# Patient Record
Sex: FEMALE | Race: WHITE | NOT HISPANIC OR LATINO | URBAN - METROPOLITAN AREA
[De-identification: names, ages, dates, MRNs, and addresses within clinical notes are randomized per-mention and may not be internally consistent; named-entity substitution may affect disease eponyms.]

---

## 2021-06-02 ENCOUNTER — TELEPHONE (OUTPATIENT)
Dept: PEDIATRICS CLINIC | Facility: CLINIC | Age: 31
End: 2021-06-02

## 2021-06-02 NOTE — TELEPHONE ENCOUNTER
Most of family is not vaccinated and mom has questions of how safe it would be for them coming to visit baby  Please call Gian Oates at [phone]  Thank you

## 2022-12-05 ENCOUNTER — HOSPITAL ENCOUNTER (OUTPATIENT)
Dept: CT IMAGING | Facility: HOSPITAL | Age: 32
Discharge: HOME | End: 2022-12-05

## 2022-12-05 DIAGNOSIS — J32.9 SINUSITIS, UNSPECIFIED CHRONICITY, UNSPECIFIED LOCATION: ICD-10-CM

## 2023-02-08 PROBLEM — D64.9 ANEMIA: Status: ACTIVE | Noted: 2023-02-08

## 2023-02-08 PROBLEM — F41.1 GENERALIZED ANXIETY DISORDER: Status: ACTIVE | Noted: 2023-02-08

## 2023-02-08 PROBLEM — K44.9 HIATAL HERNIA: Status: ACTIVE | Noted: 2022-06-15

## 2023-02-08 PROBLEM — Z88.9 HISTORY OF SEASONAL ALLERGIES: Status: ACTIVE | Noted: 2023-02-08

## 2023-02-08 PROBLEM — J30.89 ALLERGIC RHINITIS DUE TO HOUSE DUST MITE: Status: ACTIVE | Noted: 2023-02-08

## 2023-02-08 PROBLEM — J30.1 CHRONIC SEASONAL ALLERGIC RHINITIS DUE TO POLLEN: Status: ACTIVE | Noted: 2023-02-08

## 2023-02-08 PROBLEM — R94.31 RIGHT AXIS DEVIATION: Status: ACTIVE | Noted: 2022-03-10

## 2025-01-09 ENCOUNTER — TELEPHONE (OUTPATIENT)
Age: 35
End: 2025-01-09

## 2025-01-09 NOTE — TELEPHONE ENCOUNTER
LVM - Called patient to ask for any updated insurance info and inform patient that if her insurance is still [facility] that [facility] does not take that insurance and that she would then become self pay and she would have to pay a $75.50 charge at the time of service.

## 2025-01-14 ENCOUNTER — OFFICE VISIT (OUTPATIENT)
Age: 35
End: 2025-01-14

## 2025-01-14 VITALS
HEART RATE: 70 BPM | BODY MASS INDEX: 25.52 KG/M2 | HEIGHT: 60 IN | SYSTOLIC BLOOD PRESSURE: 114 MMHG | DIASTOLIC BLOOD PRESSURE: 69 MMHG | WEIGHT: 130 LBS

## 2025-01-14 DIAGNOSIS — M54.59 MECHANICAL LOW BACK PAIN: ICD-10-CM

## 2025-01-14 DIAGNOSIS — M99.01 SEGMENTAL DYSFUNCTION OF CERVICAL REGION: ICD-10-CM

## 2025-01-14 DIAGNOSIS — M99.04 SEGMENTAL DYSFUNCTION OF SACRAL REGION: ICD-10-CM

## 2025-01-14 DIAGNOSIS — M99.02 SEGMENTAL DYSFUNCTION OF THORACIC REGION: ICD-10-CM

## 2025-01-14 DIAGNOSIS — M54.2 NECK PAIN: ICD-10-CM

## 2025-01-14 DIAGNOSIS — M99.03 SEGMENTAL DYSFUNCTION OF LUMBAR REGION: Primary | ICD-10-CM

## 2025-01-14 PROCEDURE — 98941 CHIROPRACT MANJ 3-4 REGIONS: CPT | Performed by: CHIROPRACTOR

## 2025-01-14 PROCEDURE — 99203 OFFICE O/P NEW LOW 30 MIN: CPT | Performed by: CHIROPRACTOR

## 2025-01-14 PROCEDURE — 97110 THERAPEUTIC EXERCISES: CPT | Performed by: CHIROPRACTOR

## 2025-01-14 NOTE — PROGRESS NOTES
Diagnoses and all orders for this visit:    Segmental dysfunction of lumbar region    Neck pain    Mechanical low back pain    Segmental dysfunction of sacral region    Segmental dysfunction of thoracic region    Segmental dysfunction of cervical region      ASSESSMENT:  No red flags, radiculopathy or neurologic deficit appreciated clinically. Pt's symptoms and exam findings consistent with mechanical neck/ubp secondary to repetitive st/sp injury, exacerbated by postural/ergonomic stressors. Pt responded well to stretches and manual mobilization of the affected spinal and myofascial tissues with increased ROM; trial of conservative tx recommended consisting of stretching, ther-ex, graded mobilization/manipulation of the affected spinal/myofascial tissues, postural/ergonomic education and take home stretches/exercises. If symptoms fail to improve with short trial of conservative care, appropriate imaging and referral will be coordinated.  Spent greater than 30 min c pt discussing hx, pe, ddx, tx options and reviewing notes/imaging    PROCEDURE CODES: 20651    TREATMENT:  Fear avoidance behavior discussion, encouraged and reassured pt that natural course of condition is to improve over time with adherence to tx plan and home care strategies. Home care recommendations: avoid bed rest, walk (but avoid trails and uneven surfaces), gradual return to activity to tolerance (avoid anything that peripheralizes symptoms), ice 20 min on/off, watch for ice burn, call if symptoms peripheralize, worsen, or neurologic deficit progresses. Ther-ex: IASTM - discussed post procedure soreness and/or ecchymosis for up to 36 hrs, applied to affected mm hypertonicities; wall arely, axial retraction, upper trap stretch, lev scap stretch, SCM stretch, lat rows with t-band, lat pull down with t-band, abdominal bracing; greater than 15 min spent performing above mentioned ther-ex. Thoracic mobilization/manipulation: prone P-A mob; cervical  "mobilization/manipulation: diversified supine graded mobilization, cervical traction, prone C/T jct HVLA    HPI:  Tabatha Moe is a 34 y.o. female   Chief Complaint   Patient presents with   • Neck - Follow-up     Neck pain that radiates down left side with numbness and sore feeling that is constant. . Patient states a \"knot \"   Pain score 7-9   • Back Pain     Lower lumbar pain that radiates down left side. Patient states pinching and sore. Pain score 6-8        The patient presents to the office with L sided neck pain and lower back pain across the back. Both pain complaints are chronic in nature- neck pain ranges from 7-9/10 and lower back is 6-8/10. The patient has gone to Chiropractic since she was 11 but then mentions her chiropractor retired. Exacerbations-Looking down and working at [email or web address]. 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 The pt across the lower back, its pinching with some shooting. He has a 3 yr old and although is usually physical active at this time is not doing much exercise. Headaches- suboccipital, only frequently, No significant exercise routine.    Back Pain  Associated symptoms include headaches. Pertinent negatives include no chest pain, fever, numbness or weakness.     Past Medical History:   Diagnosis Date   • Anemia    • Anxiety    • Deviated septum    • Frequent urination    • Gastritis    • Headache    • Hiatal hernia    • History of chickenpox    • Post partum depression 06/30/2021   • Reflux esophagitis    • Sinusitis    • Wears contact lenses       The following portions of the patient's history were reviewed and updated as appropriate: allergies, past family history, past medical history, past social history, past surgical history, and problem list.  Review of Systems   Constitutional:  Negative for activity change, fatigue, fever and unexpected weight change.   HENT:  Negative for ear pain, hearing loss, sinus pressure, sinus pain, sore " throat and tinnitus.    Respiratory:  Negative for chest tightness, shortness of breath, wheezing and stridor.    Cardiovascular:  Negative for chest pain.   Genitourinary:  Negative for flank pain and frequency.   Musculoskeletal:  Positive for back pain, myalgias, neck pain and neck stiffness. Negative for joint swelling.   Skin:  Negative for color change and pallor.   Neurological:  Positive for headaches. Negative for dizziness, speech difficulty, weakness and numbness.   Psychiatric/Behavioral:  Negative for agitation and sleep disturbance. The patient is not nervous/anxious.      Physical Exam  Constitutional:       General: She is not in acute distress.     Appearance: Normal appearance.   HENT:      Head: Normocephalic.      Mouth/Throat:      Mouth: Mucous membranes are moist.   Eyes:      Extraocular Movements: Extraocular movements intact.      Conjunctiva/sclera: Conjunctivae normal.      Pupils: Pupils are equal, round, and reactive to light.   Neck:      Vascular: No carotid bruit.   Pulmonary:      Effort: Pulmonary effort is normal.   Chest:      Chest wall: No tenderness.   Abdominal:      General: Abdomen is flat.      Palpations: Abdomen is soft.   Musculoskeletal:         General: Tenderness present. No swelling, deformity or signs of injury. Normal range of motion.        Arms:       Cervical back: Normal range of motion. Rigidity and tenderness present.      Right lower leg: No edema.      Left lower leg: No edema.   Lymphadenopathy:      Cervical: No cervical adenopathy.   Skin:     General: Skin is warm.      Coloration: Skin is not jaundiced or pale.      Findings: No bruising or erythema.   Neurological:      Mental Status: She is alert and oriented to person, place, and time.      Cranial Nerves: No cranial nerve deficit.      Sensory: No sensory deficit.      Motor: No weakness.      Gait: Gait is intact.      Deep Tendon Reflexes: Reflexes are normal and symmetric.   Psychiatric:          Attention and Perception: Attention normal.         Mood and Affect: Mood and affect normal.         Speech: Speech normal.         Behavior: Behavior normal. Behavior is cooperative.         Thought Content: Thought content normal.         Cognition and Memory: Cognition normal.         Judgment: Judgment normal.       SOFT TISSUE ASSESSMENT: Hypertonicity and tenderness palpated C5-T6 erector spinae, upper traps, lev scap, SCM, scalene, rhomboid, suboccipitals, L3-S1 QL, parapsinals, psoas, hip flexor JOINT RECTRICTIONS: C5-T6, L3-S1 ORTHO: Latia unremarkable for centralization/peripheralization; max foraminal comp elicits local np R/L; shoulder depression elicits stiffness in L upper trap; brachial plexus tension test elicits no neural tension in R/L UE; cervical distraction elicits relieves CC, SLUMP- neg, Sitting ROOT- Neg, SLR- neg    Return in about 1 week (around 1/21/2025) for Recheck.

## 2025-01-28 ENCOUNTER — PROCEDURE VISIT (OUTPATIENT)
Age: 35
End: 2025-01-28

## 2025-01-28 VITALS
DIASTOLIC BLOOD PRESSURE: 90 MMHG | HEART RATE: 88 BPM | WEIGHT: 130 LBS | SYSTOLIC BLOOD PRESSURE: 140 MMHG | HEIGHT: 60 IN | BODY MASS INDEX: 25.52 KG/M2

## 2025-01-28 DIAGNOSIS — M99.02 SEGMENTAL DYSFUNCTION OF THORACIC REGION: ICD-10-CM

## 2025-01-28 DIAGNOSIS — M99.03 SEGMENTAL DYSFUNCTION OF LUMBAR REGION: ICD-10-CM

## 2025-01-28 DIAGNOSIS — M99.04 SEGMENTAL DYSFUNCTION OF SACRAL REGION: ICD-10-CM

## 2025-01-28 DIAGNOSIS — M54.2 NECK PAIN: Primary | ICD-10-CM

## 2025-01-28 DIAGNOSIS — M99.01 SEGMENTAL DYSFUNCTION OF CERVICAL REGION: ICD-10-CM

## 2025-01-28 DIAGNOSIS — M54.59 MECHANICAL LOW BACK PAIN: ICD-10-CM

## 2025-01-28 PROCEDURE — 98941 CHIROPRACT MANJ 3-4 REGIONS: CPT | Performed by: CHIROPRACTOR

## 2025-01-28 NOTE — PROGRESS NOTES
Diagnoses and all orders for this visit:    Neck pain    Segmental dysfunction of lumbar region    Mechanical low back pain    Segmental dysfunction of sacral region    Segmental dysfunction of thoracic region    Segmental dysfunction of cervical region      ASSESSMENT:  Pt's symptoms and exam findings consistent with mechanical neck/ubp secondary to repetitive st/sp injury, exacerbated by postural/ergonomic stressors. Pt responded well to stretches and manual mobilization of the affected spinal and myofascial tissues with increased ROM; trial of conservative tx recommended consisting of stretching, ther-ex, graded mobilization/manipulation of the affected spinal/myofascial tissues, postural/ergonomic education and take home stretches/exercises. If symptoms fail to improve with short trial of conservative care, appropriate imaging and referral will be coordinated.    PROCEDURE CODES: 78817    TREATMENT:  Fear avoidance behavior discussion, encouraged and reassured pt that natural course of condition is to improve over time with adherence to tx plan and home care strategies. Home care recommendations: avoid bed rest, walk (but avoid trails and uneven surfaces), gradual return to activity to tolerance (avoid anything that peripheralizes symptoms), ice 20 min on/off, watch for ice burn, call if symptoms peripheralize, worsen, or neurologic deficit progresses. Ther-ex: IASTM - discussed post procedure soreness and/or ecchymosis for up to 36 hrs, applied to affected mm hypertonicities; wall arely, axial retraction, upper trap stretch, lev scap stretch, SCM stretch, lat rows with t-band, lat pull down with t-band, abdominal bracing; greater than 15 min spent performing above mentioned ther-ex. Thoracic mobilization/manipulation: prone P-A mob; cervical mobilization/manipulation: diversified supine graded mobilization, cervical traction, prone C/T jct HVLA    HPI:  Tabatha Moe is a 34 y.o. female   Chief Complaint    Patient presents with   • Neck - Follow-up     Neck has slightly sore on left and right that moves around. Pain score 6      • Back Pain     Lower lumbar is very sore on left side   . Pain score 7        The patient presents to the office with L sided neck pain and lower back pain across the back. Both pain complaints are chronic in nature- neck pain ranges from 7-9/10 and lower back is 6-8/10. The patient has gone to Chiropractic since she was 11 but then mentions her chiropractor retired. Exacerbations-Looking down and working at computer. 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 The pt across the lower back, its pinching with some shooting. He has a 3 yr old and although is usually physical active at this time is not doing much exercise. Headaches- suboccipital, only frequently, No significant exercise routine.  1/28/25- The patient     Back Pain  Associated symptoms include headaches. Pertinent negatives include no chest pain, fever, numbness or weakness.     Past Medical History:   Diagnosis Date   • Anemia    • Anxiety    • Deviated septum    • Frequent urination    • Gastritis    • Headache    • Hiatal hernia    • History of chickenpox    • Post partum depression 06/30/2021   • Reflux esophagitis    • Sinusitis    • Wears contact lenses       The following portions of the patient's history were reviewed and updated as appropriate: allergies, past family history, past medical history, past social history, past surgical history, and problem list.  Review of Systems   Constitutional:  Negative for activity change, fatigue, fever and unexpected weight change.   HENT:  Negative for ear pain, hearing loss, sinus pressure, sinus pain, sore throat and tinnitus.    Respiratory:  Negative for chest tightness, shortness of breath, wheezing and stridor.    Cardiovascular:  Negative for chest pain.   Genitourinary:  Negative for flank pain and frequency.   Musculoskeletal:  Positive for  back pain, myalgias, neck pain and neck stiffness. Negative for joint swelling.   Skin:  Negative for color change and pallor.   Neurological:  Positive for headaches. Negative for dizziness, speech difficulty, weakness and numbness.   Psychiatric/Behavioral:  Negative for agitation and sleep disturbance. The patient is not nervous/anxious.      Physical Exam  Constitutional:       General: She is not in acute distress.     Appearance: Normal appearance.   HENT:      Head: Normocephalic.      Mouth/Throat:      Mouth: Mucous membranes are moist.   Eyes:      Extraocular Movements: Extraocular movements intact.      Conjunctiva/sclera: Conjunctivae normal.      Pupils: Pupils are equal, round, and reactive to light.   Neck:      Vascular: No carotid bruit.   Pulmonary:      Effort: Pulmonary effort is normal.   Chest:      Chest wall: No tenderness.   Abdominal:      General: Abdomen is flat.      Palpations: Abdomen is soft.   Musculoskeletal:         General: Tenderness present. No swelling, deformity or signs of injury. Normal range of motion.        Arms:       Cervical back: Normal range of motion. Rigidity and tenderness present.      Right lower leg: No edema.      Left lower leg: No edema.   Lymphadenopathy:      Cervical: No cervical adenopathy.   Skin:     General: Skin is warm.      Coloration: Skin is not jaundiced or pale.      Findings: No bruising or erythema.   Neurological:      Mental Status: She is alert and oriented to person, place, and time.      Cranial Nerves: No cranial nerve deficit.      Sensory: No sensory deficit.      Motor: No weakness.      Gait: Gait is intact.      Deep Tendon Reflexes: Reflexes are normal and symmetric.   Psychiatric:         Attention and Perception: Attention normal.         Mood and Affect: Mood and affect normal.         Speech: Speech normal.         Behavior: Behavior normal. Behavior is cooperative.         Thought Content: Thought content normal.          Cognition and Memory: Cognition normal.         Judgment: Judgment normal.     SOFT TISSUE ASSESSMENT: Hypertonicity and tenderness palpated C5-T6 erector spinae, upper traps, lev scap, SCM, scalene, rhomboid, suboccipitals, L3-S1 QL, parapsinals, psoas, hip flexor JOINT RECTRICTIONS: C5-T6, L3-S1 ORTHO: Latia unremarkable for centralization/peripheralization; max foraminal comp elicits local np R/L; shoulder depression elicits stiffness in L upper trap; brachial plexus tension test elicits no neural tension in R/L UE; cervical distraction elicits relieves CC, SLUMP- neg, Sitting ROOT- Neg, SLR- neg    Return in about 1 week (around 2/4/2025) for Recheck.

## 2025-02-04 ENCOUNTER — PROCEDURE VISIT (OUTPATIENT)
Age: 35
End: 2025-02-04

## 2025-02-04 VITALS
HEIGHT: 60 IN | BODY MASS INDEX: 25.52 KG/M2 | DIASTOLIC BLOOD PRESSURE: 98 MMHG | HEART RATE: 91 BPM | SYSTOLIC BLOOD PRESSURE: 149 MMHG | WEIGHT: 130 LBS

## 2025-02-04 DIAGNOSIS — M99.02 SEGMENTAL DYSFUNCTION OF THORACIC REGION: ICD-10-CM

## 2025-02-04 DIAGNOSIS — M54.59 MECHANICAL LOW BACK PAIN: ICD-10-CM

## 2025-02-04 DIAGNOSIS — M99.04 SEGMENTAL DYSFUNCTION OF SACRAL REGION: ICD-10-CM

## 2025-02-04 DIAGNOSIS — M99.01 SEGMENTAL DYSFUNCTION OF CERVICAL REGION: ICD-10-CM

## 2025-02-04 DIAGNOSIS — M99.03 SEGMENTAL DYSFUNCTION OF LUMBAR REGION: ICD-10-CM

## 2025-02-04 DIAGNOSIS — M54.2 NECK PAIN: Primary | ICD-10-CM

## 2025-02-04 PROCEDURE — 98941 CHIROPRACT MANJ 3-4 REGIONS: CPT | Performed by: CHIROPRACTOR

## 2025-02-04 RX ORDER — CALCIUM CARBONATE 500 MG/1
TABLET, CHEWABLE ORAL
COMMUNITY

## 2025-02-04 NOTE — PROGRESS NOTES
Diagnoses and all orders for this visit:    Neck pain    Segmental dysfunction of lumbar region    Mechanical low back pain    Segmental dysfunction of sacral region    Segmental dysfunction of thoracic region    Segmental dysfunction of cervical region    Other orders  -     calcium carbonate (Tums) 500 mg chewable tablet      ASSESSMENT:  Pt's symptoms and exam findings consistent with mechanical neck/ubp secondary to repetitive st/sp injury, exacerbated by postural/ergonomic stressors. Pt responded well to stretches and manual mobilization of the affected spinal and myofascial tissues with increased ROM; trial of conservative tx recommended consisting of stretching, ther-ex, graded mobilization/manipulation of the affected spinal/myofascial tissues, postural/ergonomic education and take home stretches/exercises. If symptoms fail to improve with short trial of conservative care, appropriate imaging and referral will be coordinated.  -Pt has little change last visit, tolerated treatment well today with some decrease in pain and mm spasm.     PROCEDURE CODES: 20120    TREATMENT:  Fear avoidance behavior discussion, encouraged and reassured pt that natural course of condition is to improve over time with adherence to tx plan and home care strategies. Home care recommendations: avoid bed rest, walk (but avoid trails and uneven surfaces), gradual return to activity to tolerance (avoid anything that peripheralizes symptoms), ice 20 min on/off, watch for ice burn, call if symptoms peripheralize, worsen, or neurologic deficit progresses. Ther-ex: IASTM - discussed post procedure soreness and/or ecchymosis for up to 36 hrs, applied to affected mm hypertonicities; wall arely, axial retraction, upper trap stretch, lev scap stretch, SCM stretch, lat rows with t-band, lat pull down with t-band, abdominal bracing; greater than 15 min spent performing above mentioned ther-ex. Thoracic mobilization/manipulation: prone P-A mob;  cervical mobilization/manipulation: diversified supine graded mobilization, cervical traction, prone C/T jct HVLA    HPI:  Tabatha Moe is a 34 y.o. female   Chief Complaint   Patient presents with   • Neck - Neck Pain     Pain score - 6   • Lower Back - Back Pain     Pain score 7 - Her back hurts more on the left side ( buttock)     The patient presents to the office with L sided neck pain and lower back pain across the back. Both pain complaints are chronic in nature- neck pain ranges from 7-9/10 and lower back is 6-8/10. The patient has gone to Chiropractic since she was 11 but then mentions her chiropractor retired. Exacerbations-Looking down and working at computer. 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 The pt across the lower back, its pinching with some shooting. He has a 3 yr old and although is usually physical active at this time is not doing much exercise. Headaches- suboccipital, only frequently, No significant exercise routine.  1/28/25- The patient   2/4/25- 6-7 on the R and  and 7-8/10 in the lower back, no change in pain after her last visit.     Back Pain  Associated symptoms include headaches.   Neck Pain   Associated symptoms include headaches.     Past Medical History:   Diagnosis Date   • Anemia    • Anxiety    • Deviated septum    • Frequent urination    • Gastritis    • Headache    • Hiatal hernia    • History of chickenpox    • Post partum depression 06/30/2021   • Reflux esophagitis    • Sinusitis    • Wears contact lenses       The following portions of the patient's history were reviewed and updated as appropriate: allergies, past family history, past medical history, past social history, past surgical history, and problem list.  Review of Systems   Musculoskeletal:  Positive for back pain, myalgias, neck pain and neck stiffness.   Neurological:  Positive for headaches.     Physical Exam  Constitutional:       Appearance: Normal appearance.   HENT:       Head: Normocephalic.   Eyes:      Extraocular Movements: Extraocular movements intact.      Conjunctiva/sclera: Conjunctivae normal.      Pupils: Pupils are equal, round, and reactive to light.   Abdominal:      General: Abdomen is flat.      Palpations: Abdomen is soft.   Musculoskeletal:         General: Tenderness present. Normal range of motion.        Arms:       Cervical back: Normal range of motion. Rigidity and tenderness present.   Neurological:      Mental Status: She is alert and oriented to person, place, and time.      Gait: Gait is intact.      Deep Tendon Reflexes: Reflexes are normal and symmetric.   Psychiatric:         Attention and Perception: Attention normal.         Mood and Affect: Mood and affect normal.         Speech: Speech normal.         Behavior: Behavior normal. Behavior is cooperative.         Thought Content: Thought content normal.         Cognition and Memory: Cognition normal.         Judgment: Judgment normal.       SOFT TISSUE ASSESSMENT: Hypertonicity and tenderness palpated C5-T6 erector spinae, upper traps, lev scap, SCM, scalene, rhomboid, suboccipitals, L3-S1 QL, parapsinals, psoas, hip flexor JOINT RECTRICTIONS: C5-T6, L3-S1 ORTHO: Latia unremarkable for centralization/peripheralization; max foraminal comp elicits local np R/L; shoulder depression elicits stiffness in L upper trap; brachial plexus tension test elicits no neural tension in R/L UE; cervical distraction elicits relieves CC, SLUMP- neg, Sitting ROOT- Neg, SLR- neg    Return in about 1 week (around 2/11/2025) for Recheck.

## 2025-02-11 ENCOUNTER — PROCEDURE VISIT (OUTPATIENT)
Age: 35
End: 2025-02-11

## 2025-02-11 DIAGNOSIS — M99.02 SEGMENTAL DYSFUNCTION OF THORACIC REGION: ICD-10-CM

## 2025-02-11 DIAGNOSIS — M99.04 SEGMENTAL DYSFUNCTION OF SACRAL REGION: ICD-10-CM

## 2025-02-11 DIAGNOSIS — M54.59 MECHANICAL LOW BACK PAIN: ICD-10-CM

## 2025-02-11 DIAGNOSIS — M99.03 SEGMENTAL DYSFUNCTION OF LUMBAR REGION: ICD-10-CM

## 2025-02-11 DIAGNOSIS — M99.01 SEGMENTAL DYSFUNCTION OF CERVICAL REGION: ICD-10-CM

## 2025-02-11 DIAGNOSIS — M54.2 NECK PAIN: Primary | ICD-10-CM

## 2025-02-11 PROCEDURE — 98941 CHIROPRACT MANJ 3-4 REGIONS: CPT | Performed by: CHIROPRACTOR

## 2025-02-11 RX ORDER — DEXTROMETHORPHAN HYDROBROMIDE AND PROMETHAZINE HYDROCHLORIDE 15; 6.25 MG/5ML; MG/5ML
5 SYRUP ORAL 4 TIMES DAILY PRN
COMMUNITY
Start: 2025-02-07

## 2025-02-11 RX ORDER — PREDNISONE 20 MG/1
20 TABLET ORAL DAILY
COMMUNITY
Start: 2025-02-07 | End: 2025-02-12

## 2025-02-11 RX ORDER — ALBUTEROL SULFATE 90 UG/1
2 INHALANT RESPIRATORY (INHALATION) EVERY 6 HOURS PRN
COMMUNITY
Start: 2025-02-07

## 2025-02-11 NOTE — PROGRESS NOTES
Diagnoses and all orders for this visit:    Neck pain    Segmental dysfunction of lumbar region    Mechanical low back pain    Segmental dysfunction of sacral region    Segmental dysfunction of thoracic region    Segmental dysfunction of cervical region    Other orders  -     albuterol (PROVENTIL HFA,VENTOLIN HFA) 90 mcg/act inhaler; Inhale 2 puffs every 6 (six) hours as needed  -     predniSONE 20 mg tablet; Take 20 mg by mouth daily  -     promethazine-dextromethorphan (PHENERGAN-DM) 6.25-15 mg/5 mL oral syrup; Take 5 mL by mouth 4 (four) times a day as needed      ASSESSMENT:  Pt's symptoms and exam findings consistent with mechanical neck/ubp secondary to repetitive st/sp injury, exacerbated by postural/ergonomic stressors. Pt responded well to stretches and manual mobilization of the affected spinal and myofascial tissues with increased ROM; trial of conservative tx recommended consisting of stretching, ther-ex, graded mobilization/manipulation of the affected spinal/myofascial tissues, postural/ergonomic education and take home stretches/exercises. If symptoms fail to improve with short trial of conservative care, appropriate imaging and referral will be coordinated.  -Pt has little change last visit, tolerated treatment well today with some decrease in pain and mm spasm.     PROCEDURE CODES: 14315    TREATMENT:  Fear avoidance behavior discussion, encouraged and reassured pt that natural course of condition is to improve over time with adherence to tx plan and home care strategies. Home care recommendations: avoid bed rest, walk (but avoid trails and uneven surfaces), gradual return to activity to tolerance (avoid anything that peripheralizes symptoms), ice 20 min on/off, watch for ice burn, call if symptoms peripheralize, worsen, or neurologic deficit progresses. Ther-ex: IASTM - discussed post procedure soreness and/or ecchymosis for up to 36 hrs, applied to affected mm hypertonicities; wall arely, axial  retraction, upper trap stretch, lev scap stretch, SCM stretch, lat rows with t-band, lat pull down with t-band, abdominal bracing; greater than 15 min spent performing above mentioned ther-ex. Thoracic mobilization/manipulation: prone P-A mob; cervical mobilization/manipulation: diversified supine graded mobilization, cervical traction, prone C/T jct HVLA    HPI:  Tabatha Moe is a 34 y.o. female   Chief Complaint   Patient presents with   • Neck - Follow-up     Neck pain score 7     Patient is getting over being sick    • Rib Pain     Left rib cage around is sore from coughing      • Back Pain     Lower lumbar is  sore . Pain score 7        The patient presents to the office with L sided neck pain and lower back pain across the back. Both pain complaints are chronic in nature- neck pain ranges from 7-9/10 and lower back is 6-8/10. The patient has gone to Chiropractic since she was 11 but then mentions her chiropractor retired. Exacerbations-Looking down and working at computer. 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 The pt across the lower back, its pinching with some shooting. He has a 3 yr old and although is usually physical active at this time is not doing much exercise. Headaches- suboccipital, only frequently, No significant exercise routine.  1/28/25- The patient   2/4/25- 6-7 on the R and  and 7-8/10 in the lower back, no change in pain after her last visit.   2/11- The patient has increased pain after being sick with bronchitis,  L rib cage pain. 7/10 pain in the neck and back    Back Pain  Associated symptoms include headaches.   Neck Pain   Associated symptoms include headaches.     Past Medical History:   Diagnosis Date   • Anemia    • Anxiety    • Deviated septum    • Frequent urination    • Gastritis    • Headache    • Hiatal hernia    • History of chickenpox    • Post partum depression 06/30/2021   • Reflux esophagitis    • Sinusitis    • Wears contact lenses        The following portions of the patient's history were reviewed and updated as appropriate: allergies, past family history, past medical history, past social history, past surgical history, and problem list.  Review of Systems   Musculoskeletal:  Positive for back pain, myalgias, neck pain and neck stiffness.   Neurological:  Positive for headaches.     Physical Exam  Constitutional:       Appearance: Normal appearance.   HENT:      Head: Normocephalic.   Eyes:      Extraocular Movements: Extraocular movements intact.      Conjunctiva/sclera: Conjunctivae normal.      Pupils: Pupils are equal, round, and reactive to light.   Abdominal:      General: Abdomen is flat.      Palpations: Abdomen is soft.   Musculoskeletal:         General: Tenderness present. Normal range of motion.        Arms:       Cervical back: Normal range of motion. Rigidity and tenderness present.   Neurological:      Mental Status: She is alert and oriented to person, place, and time.      Gait: Gait is intact.      Deep Tendon Reflexes: Reflexes are normal and symmetric.   Psychiatric:         Attention and Perception: Attention normal.         Mood and Affect: Mood and affect normal.         Speech: Speech normal.         Behavior: Behavior normal. Behavior is cooperative.         Thought Content: Thought content normal.         Cognition and Memory: Cognition normal.         Judgment: Judgment normal.       SOFT TISSUE ASSESSMENT: Hypertonicity and tenderness palpated C5-T6 erector spinae, upper traps, lev scap, SCM, scalene, rhomboid, suboccipitals, L3-S1 QL, parapsinals, psoas, hip flexor JOINT RECTRICTIONS: C5-T6, L3-S1 ORTHO: Latia unremarkable for centralization/peripheralization; max foraminal comp elicits local np R/L; shoulder depression elicits stiffness in L upper trap; brachial plexus tension test elicits no neural tension in R/L UE; cervical distraction elicits relieves CC, SLUMP- neg, Sitting ROOT- Neg, SLR- neg    Return  in about 1 week (around 2/18/2025) for Recheck.

## 2025-02-18 ENCOUNTER — PROCEDURE VISIT (OUTPATIENT)
Age: 35
End: 2025-02-18

## 2025-02-18 VITALS — BODY MASS INDEX: 25.52 KG/M2 | HEIGHT: 60 IN | WEIGHT: 130 LBS

## 2025-02-18 DIAGNOSIS — M99.02 SEGMENTAL DYSFUNCTION OF THORACIC REGION: ICD-10-CM

## 2025-02-18 DIAGNOSIS — M54.59 MECHANICAL LOW BACK PAIN: ICD-10-CM

## 2025-02-18 DIAGNOSIS — M99.04 SEGMENTAL DYSFUNCTION OF SACRAL REGION: ICD-10-CM

## 2025-02-18 DIAGNOSIS — M54.2 NECK PAIN: Primary | ICD-10-CM

## 2025-02-18 DIAGNOSIS — M99.01 SEGMENTAL DYSFUNCTION OF CERVICAL REGION: ICD-10-CM

## 2025-02-18 DIAGNOSIS — M99.03 SEGMENTAL DYSFUNCTION OF LUMBAR REGION: ICD-10-CM

## 2025-02-18 PROCEDURE — 98941 CHIROPRACT MANJ 3-4 REGIONS: CPT | Performed by: CHIROPRACTOR

## 2025-02-18 RX ORDER — FERROUS SULFATE 325(65) MG
325 TABLET ORAL
COMMUNITY

## 2025-02-18 NOTE — PROGRESS NOTES
Diagnoses and all orders for this visit:    Neck pain    Segmental dysfunction of lumbar region    Mechanical low back pain    Segmental dysfunction of sacral region    Segmental dysfunction of thoracic region    Segmental dysfunction of cervical region    Other orders  -     ferrous sulfate 325 (65 Fe) mg tablet; 325 mg daily with breakfast      ASSESSMENT:  Pt's symptoms and exam findings consistent with mechanical neck/ubp secondary to repetitive st/sp injury, exacerbated by postural/ergonomic stressors. Pt responded well to stretches and manual mobilization of the affected spinal and myofascial tissues with increased ROM; trial of conservative tx recommended consisting of stretching, ther-ex, graded mobilization/manipulation of the affected spinal/myofascial tissues, postural/ergonomic education and take home stretches/exercises. If symptoms fail to improve with short trial of conservative care, appropriate imaging and referral will be coordinated.  -Pt has little change last visit, tolerated treatment well today with some decrease in pain and mm spasm.     PROCEDURE CODES: 08041    TREATMENT:  Fear avoidance behavior discussion, encouraged and reassured pt that natural course of condition is to improve over time with adherence to tx plan and home care strategies. Home care recommendations: avoid bed rest, walk (but avoid trails and uneven surfaces), gradual return to activity to tolerance (avoid anything that peripheralizes symptoms), ice 20 min on/off, watch for ice burn, call if symptoms peripheralize, worsen, or neurologic deficit progresses. Ther-ex: IASTM - discussed post procedure soreness and/or ecchymosis for up to 36 hrs, applied to affected mm hypertonicities; wall arely, axial retraction, upper trap stretch, lev scap stretch, SCM stretch, lat rows with t-band, lat pull down with t-band, abdominal bracing; greater than 15 min spent performing above mentioned ther-ex. Thoracic  mobilization/manipulation: prone P-A mob; cervical mobilization/manipulation: diversified supine graded mobilization, cervical traction, prone C/T jct HVLA    HPI:  Tabatha Moe is a 34 y.o. female   Chief Complaint   Patient presents with   • Neck - Follow-up     Neck pain score 6      • Back Pain     Lower lumbar pain score 6      • Rib Pain     Left rib cage is slightly better    Pain score 6     The patient presents to the office with L sided neck pain and lower back pain across the back. Both pain complaints are chronic in nature- neck pain ranges from 7-9/10 and lower back is 6-8/10. The patient has gone to Chiropractic since she was 11 but then mentions her chiropractor retired. Exacerbations-Looking down and working at [employer]. 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 The pt across the lower back, its pinching with some shooting. He has a 3 yr old and although is usually physical active at this time is not doing much exercise. Headaches- suboccipital, only frequently, No significant exercise routine.  1/28/25- The patient   2/4/25- 6-7 on the R and  and 7-8/10 in the lower back, no change in pain after her last visit.   2/11- The patient has increased pain after being sick with bronchitis,  L rib cage pain. 7/10 pain in the neck and back  2/18/25- The patient is feeling a little b    Back Pain  Associated symptoms include headaches.   Neck Pain   Associated symptoms include headaches.     Past Medical History:   Diagnosis Date   • Anemia    • Anxiety    • Deviated septum    • Frequent urination    • Gastritis    • Headache    • Hiatal hernia    • History of chickenpox    • Post partum depression 06/30/2021   • Reflux esophagitis    • Sinusitis    • Wears contact lenses       The following portions of the patient's history were reviewed and updated as appropriate: allergies, past family history, past medical history, past social history, past surgical history, and problem  list.  Review of Systems   Musculoskeletal:  Positive for back pain, myalgias, neck pain and neck stiffness.   Neurological:  Positive for headaches.     Physical Exam  Constitutional:       Appearance: Normal appearance.   HENT:      Head: Normocephalic.   Eyes:      Extraocular Movements: Extraocular movements intact.      Conjunctiva/sclera: Conjunctivae normal.      Pupils: Pupils are equal, round, and reactive to light.   Abdominal:      General: Abdomen is flat.      Palpations: Abdomen is soft.   Musculoskeletal:         General: Tenderness present. Normal range of motion.        Arms:       Cervical back: Normal range of motion. Rigidity and tenderness present.   Neurological:      Mental Status: She is alert and oriented to person, place, and time.      Gait: Gait is intact.      Deep Tendon Reflexes: Reflexes are normal and symmetric.   Psychiatric:         Attention and Perception: Attention normal.         Mood and Affect: Mood and affect normal.         Speech: Speech normal.         Behavior: Behavior normal. Behavior is cooperative.         Thought Content: Thought content normal.         Cognition and Memory: Cognition normal.         Judgment: Judgment normal.     SOFT TISSUE ASSESSMENT: Hypertonicity and tenderness palpated C5-T6 erector spinae, upper traps, lev scap, SCM, scalene, rhomboid, suboccipitals, L3-S1 QL, parapsinals, psoas, hip flexor JOINT RECTRICTIONS: C5-T6, L3-S1 ORTHO: Latia unremarkable for centralization/peripheralization; max foraminal comp elicits local np R/L; shoulder depression elicits stiffness in L upper trap; brachial plexus tension test elicits no neural tension in R/L UE; cervical distraction elicits relieves CC, SLUMP- neg, Sitting ROOT- Neg, SLR- neg    Return in about 2 weeks (around 3/4/2025) for Recheck.

## 2025-02-27 ENCOUNTER — PROCEDURE VISIT (OUTPATIENT)
Age: 35
End: 2025-02-27

## 2025-02-27 VITALS
SYSTOLIC BLOOD PRESSURE: 124 MMHG | HEIGHT: 60 IN | BODY MASS INDEX: 25.52 KG/M2 | HEART RATE: 87 BPM | DIASTOLIC BLOOD PRESSURE: 84 MMHG | WEIGHT: 130 LBS

## 2025-02-27 DIAGNOSIS — M99.03 SEGMENTAL DYSFUNCTION OF LUMBAR REGION: ICD-10-CM

## 2025-02-27 DIAGNOSIS — M54.59 MECHANICAL LOW BACK PAIN: ICD-10-CM

## 2025-02-27 DIAGNOSIS — M99.01 SEGMENTAL DYSFUNCTION OF CERVICAL REGION: ICD-10-CM

## 2025-02-27 DIAGNOSIS — M54.2 NECK PAIN: Primary | ICD-10-CM

## 2025-02-27 DIAGNOSIS — M99.02 SEGMENTAL DYSFUNCTION OF THORACIC REGION: ICD-10-CM

## 2025-02-27 DIAGNOSIS — M99.04 SEGMENTAL DYSFUNCTION OF SACRAL REGION: ICD-10-CM

## 2025-02-27 PROCEDURE — 98941 CHIROPRACT MANJ 3-4 REGIONS: CPT | Performed by: CHIROPRACTOR

## 2025-02-27 NOTE — PROGRESS NOTES
Diagnoses and all orders for this visit:    Neck pain    Segmental dysfunction of lumbar region    Mechanical low back pain    Segmental dysfunction of sacral region    Segmental dysfunction of thoracic region    Segmental dysfunction of cervical region      ASSESSMENT:  Pt's symptoms and exam findings consistent with mechanical neck/ubp secondary to repetitive st/sp injury, exacerbated by postural/ergonomic stressors. Pt responded well to stretches and manual mobilization of the affected spinal and myofascial tissues with increased ROM; trial of conservative tx recommended consisting of stretching, ther-ex, graded mobilization/manipulation of the affected spinal/myofascial tissues, postural/ergonomic education and take home stretches/exercises. If symptoms fail to improve with short trial of conservative care, appropriate imaging and referral will be coordinated.  -Pt has little change last visit, tolerated treatment well today with some decrease in pain and mm spasm.     PROCEDURE CODES: 84936    TREATMENT:  Fear avoidance behavior discussion, encouraged and reassured pt that natural course of condition is to improve over time with adherence to tx plan and home care strategies. Home care recommendations: avoid bed rest, walk (but avoid trails and uneven surfaces), gradual return to activity to tolerance (avoid anything that peripheralizes symptoms), ice 20 min on/off, watch for ice burn, call if symptoms peripheralize, worsen, or neurologic deficit progresses. Ther-ex: IASTM - discussed post procedure soreness and/or ecchymosis for up to 36 hrs, applied to affected mm hypertonicities; wall arely, axial retraction, upper trap stretch, lev scap stretch, SCM stretch, lat rows with t-band, lat pull down with t-band, abdominal bracing; greater than 15 min spent performing above mentioned ther-ex. Thoracic mobilization/manipulation: prone P-A mob; cervical mobilization/manipulation: diversified supine graded  mobilization, cervical traction, prone C/T jct HVLA    HPI:  Tabatha Moe is a 34 y.o. female   Chief Complaint   Patient presents with   • Neck - Follow-up     Neck and right shoulder is pinching feeling . Pain score 6       • Back Pain     Left side at mid back is doing well. Pain score 4    Lower lumbar is good. Pain score 0-1      The patient presents to the office with L sided neck pain and lower back pain across the back. Both pain complaints are chronic in nature- neck pain ranges from 7-9/10 and lower back is 6-8/10. The patient has gone to Chiropractic since she was 11 but then mentions her chiropractor retired. Exacerbations-Looking down and working at computer. 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 The pt across the lower back, its pinching with some shooting. He has a 3 yr old and although is usually physical active at this time is not doing much exercise. Headaches- suboccipital, only frequently, No significant exercise routine.  1/28/25- The patient   2/4/25- 6-7 on the R and  and 7-8/10 in the lower back, no change in pain after her last visit.   2/11- The patient has increased pain after being sick with bronchitis,  L rib cage pain. 7/10 pain in the neck and back  2/18/25- The patient is feeling a little   2/27/25-- The patient is feeling a little better this week,  6/10 in the neck and the lower back  4/10 on L lower back    Back Pain  Associated symptoms include headaches.   Neck Pain   Associated symptoms include headaches.     Past Medical History:   Diagnosis Date   • Anemia    • Anxiety    • Deviated septum    • Frequent urination    • Gastritis    • Headache    • Hiatal hernia    • History of chickenpox    • Post partum depression 06/30/2021   • Reflux esophagitis    • Sinusitis    • Wears contact lenses       The following portions of the patient's history were reviewed and updated as appropriate: allergies, past family history, past medical history,  past social history, past surgical history, and problem list.  Review of Systems   Musculoskeletal:  Positive for back pain, myalgias, neck pain and neck stiffness.   Neurological:  Positive for headaches.     Physical Exam  Constitutional:       Appearance: Normal appearance.   HENT:      Head: Normocephalic.   Eyes:      Extraocular Movements: Extraocular movements intact.      Conjunctiva/sclera: Conjunctivae normal.      Pupils: Pupils are equal, round, and reactive to light.   Abdominal:      General: Abdomen is flat.      Palpations: Abdomen is soft.   Musculoskeletal:         General: Tenderness present. Normal range of motion.        Arms:       Cervical back: Normal range of motion. Rigidity and tenderness present.   Neurological:      Mental Status: She is alert and oriented to person, place, and time.      Gait: Gait is intact.      Deep Tendon Reflexes: Reflexes are normal and symmetric.   Psychiatric:         Attention and Perception: Attention normal.         Mood and Affect: Mood and affect normal.         Speech: Speech normal.         Behavior: Behavior normal. Behavior is cooperative.         Thought Content: Thought content normal.         Cognition and Memory: Cognition normal.         Judgment: Judgment normal.       SOFT TISSUE ASSESSMENT: Hypertonicity and tenderness palpated C5-T6 erector spinae, upper traps, lev scap, SCM, scalene, rhomboid, suboccipitals, L3-S1 QL, parapsinals, psoas, hip flexor JOINT RECTRICTIONS: C5-T6, L3-S1 ORTHO: Latia unremarkable for centralization/peripheralization; max foraminal comp elicits local np R/L; shoulder depression elicits stiffness in L upper trap; brachial plexus tension test elicits no neural tension in R/L UE; cervical distraction elicits relieves CC, SLUMP- neg, Sitting ROOT- Neg, SLR- neg    Return in about 1 week (around 3/6/2025) for Recheck.

## 2025-03-04 ENCOUNTER — PROCEDURE VISIT (OUTPATIENT)
Age: 35
End: 2025-03-04

## 2025-03-04 VITALS — HEIGHT: 60 IN | WEIGHT: 130 LBS | BODY MASS INDEX: 25.52 KG/M2

## 2025-03-04 DIAGNOSIS — M99.01 SEGMENTAL DYSFUNCTION OF CERVICAL REGION: ICD-10-CM

## 2025-03-04 DIAGNOSIS — M99.02 SEGMENTAL DYSFUNCTION OF THORACIC REGION: ICD-10-CM

## 2025-03-04 DIAGNOSIS — M99.03 SEGMENTAL DYSFUNCTION OF LUMBAR REGION: ICD-10-CM

## 2025-03-04 DIAGNOSIS — M99.04 SEGMENTAL DYSFUNCTION OF SACRAL REGION: ICD-10-CM

## 2025-03-04 DIAGNOSIS — M54.2 NECK PAIN: Primary | ICD-10-CM

## 2025-03-04 DIAGNOSIS — M54.59 MECHANICAL LOW BACK PAIN: ICD-10-CM

## 2025-03-04 PROCEDURE — 98941 CHIROPRACT MANJ 3-4 REGIONS: CPT | Performed by: CHIROPRACTOR

## 2025-03-04 NOTE — PROGRESS NOTES
Diagnoses and all orders for this visit:    Neck pain    Segmental dysfunction of lumbar region    Mechanical low back pain    Segmental dysfunction of sacral region    Segmental dysfunction of thoracic region    Segmental dysfunction of cervical region      ASSESSMENT:  Pt's symptoms and exam findings consistent with mechanical neck/ubp secondary to repetitive st/sp injury, exacerbated by postural/ergonomic stressors. Pt responded well to stretches and manual mobilization of the affected spinal and myofascial tissues with increased ROM; trial of conservative tx recommended consisting of stretching, ther-ex, graded mobilization/manipulation of the affected spinal/myofascial tissues, postural/ergonomic education and take home stretches/exercises. If symptoms fail to improve with short trial of conservative care, appropriate imaging and referral will be coordinated.  -Pt has little change last visit, tolerated treatment well today with some decrease in pain and mm spasm.     PROCEDURE CODES: 42415    TREATMENT:  Fear avoidance behavior discussion, encouraged and reassured pt that natural course of condition is to improve over time with adherence to tx plan and home care strategies. Home care recommendations: avoid bed rest, walk (but avoid trails and uneven surfaces), gradual return to activity to tolerance (avoid anything that peripheralizes symptoms), ice 20 min on/off, watch for ice burn, call if symptoms peripheralize, worsen, or neurologic deficit progresses. Ther-ex: IASTM - discussed post procedure soreness and/or ecchymosis for up to 36 hrs, applied to affected mm hypertonicities; wall arely, axial retraction, upper trap stretch, lev scap stretch, SCM stretch, lat rows with t-band, lat pull down with t-band, abdominal bracing; greater than 15 min spent performing above mentioned ther-ex. Thoracic mobilization/manipulation: prone P-A mob; cervical mobilization/manipulation: diversified supine graded  mobilization, cervical traction, prone C/T jct HVLA    HPI:  Tabatha Moe is a 34 y.o. female   Chief Complaint   Patient presents with   • Neck Pain     Neck pain about a 6 more on the right side    • Back Pain     Low back about a 5 left side      The patient presents to the office with L sided neck pain and lower back pain across the back. Both pain complaints are chronic in nature- neck pain ranges from 7-9/10 and lower back is 6-8/10. The patient has gone to Chiropractic since she was 11 but then mentions her chiropractor retired. Exacerbations-Looking down and working at computer. 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 The pt across the lower back, its pinching with some shooting. He has a 3 yr old and although is usually physical active at this time is not doing much exercise. Headaches- suboccipital, only frequently, No significant exercise routine.  1/28/25- The patient   2/4/25- 6-7 on the R and  and 7-8/10 in the lower back, no change in pain after her last visit.   2/11- The patient has increased pain after being sick with bronchitis,  L rib cage pain. 7/10 pain in the neck and back  2/18/25- The patient is feeling a little   2/27/25-- The patient is feeling a little better this week,  6/10 in the neck and the lower back  4/10 on L lower back  3/4/25- The patient is feeling is neck pain 6/10 more on the R and lower back pain 5/10 on the L    Back Pain  Associated symptoms include headaches.   Neck Pain   Associated symptoms include headaches.     Past Medical History:   Diagnosis Date   • Anemia    • Anxiety    • Deviated septum    • Frequent urination    • Gastritis    • Headache    • Hiatal hernia    • History of chickenpox    • Post partum depression 06/30/2021   • Reflux esophagitis    • Sinusitis    • Wears contact lenses       The following portions of the patient's history were reviewed and updated as appropriate: allergies, past family history, past medical  history, past social history, past surgical history, and problem list.  Review of Systems   Musculoskeletal:  Positive for back pain, myalgias, neck pain and neck stiffness.   Neurological:  Positive for headaches.     Physical Exam  Constitutional:       Appearance: Normal appearance.   HENT:      Head: Normocephalic.   Eyes:      Extraocular Movements: Extraocular movements intact.      Conjunctiva/sclera: Conjunctivae normal.      Pupils: Pupils are equal, round, and reactive to light.   Abdominal:      General: Abdomen is flat.      Palpations: Abdomen is soft.   Musculoskeletal:         General: Tenderness present. Normal range of motion.        Arms:       Cervical back: Normal range of motion. Rigidity and tenderness present.   Neurological:      Mental Status: She is alert and oriented to person, place, and time.      Gait: Gait is intact.      Deep Tendon Reflexes: Reflexes are normal and symmetric.   Psychiatric:         Attention and Perception: Attention normal.         Mood and Affect: Mood and affect normal.         Speech: Speech normal.         Behavior: Behavior normal. Behavior is cooperative.         Thought Content: Thought content normal.         Cognition and Memory: Cognition normal.         Judgment: Judgment normal.       SOFT TISSUE ASSESSMENT: Hypertonicity and tenderness palpated C5-T6 erector spinae, upper traps, lev scap, SCM, scalene, rhomboid, suboccipitals, L3-S1 QL, parapsinals, psoas, hip flexor JOINT RECTRICTIONS: C5-T6, L3-S1 ORTHO: Latia unremarkable for centralization/peripheralization; max foraminal comp elicits local np R/L; shoulder depression elicits stiffness in L upper trap; brachial plexus tension test elicits no neural tension in R/L UE; cervical distraction elicits relieves CC, SLUMP- neg, Sitting ROOT- Neg, SLR- neg    Return in about 1 week (around 3/11/2025) for Recheck.

## 2025-03-11 ENCOUNTER — PROCEDURE VISIT (OUTPATIENT)
Age: 35
End: 2025-03-11

## 2025-03-11 VITALS — BODY MASS INDEX: 25.52 KG/M2 | HEIGHT: 60 IN | WEIGHT: 130 LBS

## 2025-03-11 DIAGNOSIS — M99.04 SEGMENTAL DYSFUNCTION OF SACRAL REGION: ICD-10-CM

## 2025-03-11 DIAGNOSIS — M99.02 SEGMENTAL DYSFUNCTION OF THORACIC REGION: ICD-10-CM

## 2025-03-11 DIAGNOSIS — M54.2 NECK PAIN: Primary | ICD-10-CM

## 2025-03-11 DIAGNOSIS — M54.59 MECHANICAL LOW BACK PAIN: ICD-10-CM

## 2025-03-11 DIAGNOSIS — M99.01 SEGMENTAL DYSFUNCTION OF CERVICAL REGION: ICD-10-CM

## 2025-03-11 DIAGNOSIS — M99.03 SEGMENTAL DYSFUNCTION OF LUMBAR REGION: ICD-10-CM

## 2025-03-11 PROCEDURE — 98941 CHIROPRACT MANJ 3-4 REGIONS: CPT | Performed by: CHIROPRACTOR

## 2025-03-11 NOTE — PROGRESS NOTES
Diagnoses and all orders for this visit:    Neck pain    Segmental dysfunction of lumbar region    Mechanical low back pain    Segmental dysfunction of sacral region    Segmental dysfunction of cervical region    Segmental dysfunction of thoracic region      ASSESSMENT:  Pt's symptoms and exam findings consistent with mechanical neck/ubp secondary to repetitive st/sp injury, exacerbated by postural/ergonomic stressors. Pt responded well to stretches and manual mobilization of the affected spinal and myofascial tissues with increased ROM; trial of conservative tx recommended consisting of stretching, ther-ex, graded mobilization/manipulation of the affected spinal/myofascial tissues, postural/ergonomic education and take home stretches/exercises. If symptoms fail to improve with short trial of conservative care, appropriate imaging and referral will be coordinated.  -Pt has little change last visit, tolerated treatment well today with some decrease in pain and mm spasm.     PROCEDURE CODES: 37626    TREATMENT:  Fear avoidance behavior discussion, encouraged and reassured pt that natural course of condition is to improve over time with adherence to tx plan and home care strategies. Home care recommendations: avoid bed rest, walk (but avoid trails and uneven surfaces), gradual return to activity to tolerance (avoid anything that peripheralizes symptoms), ice 20 min on/off, watch for ice burn, call if symptoms peripheralize, worsen, or neurologic deficit progresses. Ther-ex: IASTM - discussed post procedure soreness and/or ecchymosis for up to 36 hrs, applied to affected mm hypertonicities; wall arely, axial retraction, upper trap stretch, lev scap stretch, SCM stretch, lat rows with t-band, lat pull down with t-band, abdominal bracing; greater than 15 min spent performing above mentioned ther-ex. Thoracic mobilization/manipulation: prone P-A mob; cervical mobilization/manipulation: diversified supine graded  mobilization, cervical traction, prone C/T jct [facility]    HPI:  Tabatha Moe is a 34 y.o. female   Chief Complaint   Patient presents with   • Neck Pain     Neck pain about a 7 sore bilateral but the left is worse    • Back Pain     Low back left side about a 6      The patient presents to the office with L sided neck pain and lower back pain across the back. Both pain complaints are chronic in nature- neck pain ranges from 7-9/10 and lower back is 6-8/10. The patient has gone to Chiropractic since she was 11 but then mentions her chiropractor retired. Exacerbations-Looking down and working at [employer]. 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 The pt across the lower back, its pinching with some shooting. He has a 3 yr old and although is usually physical active at this time is not doing much exercise. Headaches- suboccipital, only frequently, No significant exercise routine.  1/28/25- The patient   2/4/25- 6-7 on the R and  and 7-8/10 in the lower back, no change in pain after her last visit.   2/11- The patient has increased pain after being sick with bronchitis,  L rib cage pain. 7/10 pain in the neck and back  2/18/25- The patient is feeling a little   2/27/25-- The patient is feeling a little better this week,  6/10 in the neck and the lower back  4/10 on L lower back  3/4/25- The patient is feeling is neck pain 6/10 more on the R and lower back pain 5/10 on the L  3/11/25- The patient is feeling 7/10 in the L neck and L side of the of the lower back      Back Pain  Associated symptoms include headaches.   Neck Pain   Associated symptoms include headaches.     Past Medical History:   Diagnosis Date   • Anemia    • Anxiety    • Deviated septum    • Frequent urination    • Gastritis    • Headache    • Hiatal hernia    • History of chickenpox    • Post partum depression 06/30/2021   • Reflux esophagitis    • Sinusitis    • Wears contact lenses       The following portions of the  patient's history were reviewed and updated as appropriate: allergies, past family history, past medical history, past social history, past surgical history, and problem list.  Review of Systems   Musculoskeletal:  Positive for back pain, myalgias, neck pain and neck stiffness.   Neurological:  Positive for headaches.     Physical Exam  Constitutional:       Appearance: Normal appearance.   HENT:      Head: Normocephalic.   Eyes:      Extraocular Movements: Extraocular movements intact.      Conjunctiva/sclera: Conjunctivae normal.      Pupils: Pupils are equal, round, and reactive to light.   Abdominal:      General: Abdomen is flat.      Palpations: Abdomen is soft.   Musculoskeletal:         General: Tenderness present. Normal range of motion.        Arms:       Cervical back: Normal range of motion. Rigidity and tenderness present.   Neurological:      Mental Status: She is alert and oriented to person, place, and time.      Gait: Gait is intact.      Deep Tendon Reflexes: Reflexes are normal and symmetric.   Psychiatric:         Attention and Perception: Attention normal.         Mood and Affect: Mood and affect normal.         Speech: Speech normal.         Behavior: Behavior normal. Behavior is cooperative.         Thought Content: Thought content normal.         Cognition and Memory: Cognition normal.         Judgment: Judgment normal.       SOFT TISSUE ASSESSMENT: Hypertonicity and tenderness palpated C5-T6 erector spinae, upper traps, lev scap, SCM, scalene, rhomboid, suboccipitals, L3-S1 QL, parapsinals, psoas, hip flexor JOINT RECTRICTIONS: C5-T6, L3-S1 ORTHO: Latia unremarkable for centralization/peripheralization; max foraminal comp elicits local np R/L; shoulder depression elicits stiffness in L upper trap; brachial plexus tension test elicits no neural tension in R/L UE; cervical distraction elicits relieves CC, SLUMP- neg, Sitting ROOT- Neg, SLR- neg    Return in about 1 week (around 3/18/2025)  for Recheck.

## 2025-03-27 ENCOUNTER — PROCEDURE VISIT (OUTPATIENT)
Age: 35
End: 2025-03-27

## 2025-03-27 VITALS
BODY MASS INDEX: 25.52 KG/M2 | HEIGHT: 60 IN | SYSTOLIC BLOOD PRESSURE: 115 MMHG | DIASTOLIC BLOOD PRESSURE: 85 MMHG | WEIGHT: 130 LBS

## 2025-03-27 DIAGNOSIS — M99.01 SEGMENTAL DYSFUNCTION OF CERVICAL REGION: ICD-10-CM

## 2025-03-27 DIAGNOSIS — M99.02 SEGMENTAL DYSFUNCTION OF THORACIC REGION: ICD-10-CM

## 2025-03-27 DIAGNOSIS — M99.03 SEGMENTAL DYSFUNCTION OF LUMBAR REGION: ICD-10-CM

## 2025-03-27 DIAGNOSIS — M54.59 MECHANICAL LOW BACK PAIN: ICD-10-CM

## 2025-03-27 DIAGNOSIS — M99.04 SEGMENTAL DYSFUNCTION OF SACRAL REGION: ICD-10-CM

## 2025-03-27 DIAGNOSIS — M54.2 NECK PAIN: Primary | ICD-10-CM

## 2025-03-27 DIAGNOSIS — R10.2 PELVIC PAIN IN FEMALE: ICD-10-CM

## 2025-03-27 PROCEDURE — 98941 CHIROPRACT MANJ 3-4 REGIONS: CPT | Performed by: CHIROPRACTOR

## 2025-03-27 NOTE — PROGRESS NOTES
Diagnoses and all orders for this visit:    Neck pain    Segmental dysfunction of lumbar region    Mechanical low back pain    Segmental dysfunction of sacral region    Segmental dysfunction of cervical region    Segmental dysfunction of thoracic region    Pelvic pain in female  -     Ambulatory Referral to Physical Therapy; Future      ASSESSMENT:  Pt's symptoms and exam findings consistent with mechanical neck/ubp secondary to repetitive st/sp injury, exacerbated by postural/ergonomic stressors. Pt responded well to stretches and manual mobilization of the affected spinal and myofascial tissues with increased ROM; trial of conservative tx recommended consisting of stretching, ther-ex, graded mobilization/manipulation of the affected spinal/myofascial tissues, postural/ergonomic education and take home stretches/exercises. If symptoms fail to improve with short trial of conservative care, appropriate imaging and referral will be coordinated.  -Pt has little change last visit, tolerated treatment well today with some decrease in pain and mm spasm.     PROCEDURE CODES: 35034    TREATMENT:  Fear avoidance behavior discussion, encouraged and reassured pt that natural course of condition is to improve over time with adherence to tx plan and home care strategies. Home care recommendations: avoid bed rest, walk (but avoid trails and uneven surfaces), gradual return to activity to tolerance (avoid anything that peripheralizes symptoms), ice 20 min on/off, watch for ice burn, call if symptoms peripheralize, worsen, or neurologic deficit progresses. Ther-ex: IASTM - discussed post procedure soreness and/or ecchymosis for up to 36 hrs, applied to affected mm hypertonicities; wall arely, axial retraction, upper trap stretch, lev scap stretch, SCM stretch, lat rows with t-band, lat pull down with t-band, abdominal bracing; greater than 15 min spent performing above mentioned ther-ex. Thoracic mobilization/manipulation: prone  P-A mob; cervical mobilization/manipulation: diversified supine graded mobilization, cervical traction, prone C/T jct HVLA    HPI:  Tabatha Moe is a 34 y.o. female   Chief Complaint   Patient presents with   • Neck Pain     Neck pain left side about a 7    • Back Pain     Low back about an 8 in the center      The patient presents to the office with L sided neck pain and lower back pain across the back. Both pain complaints are chronic in nature- neck pain ranges from 7-9/10 and lower back is 6-8/10. The patient has gone to Chiropractic since she was 11 but then mentions her chiropractor retired. Exacerbations-Looking down and working at computer. 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 The pt across the lower back, its pinching with some shooting. He has a 3 yr old and although is usually physical active at this time is not doing much exercise. Headaches- suboccipital, only frequently, No significant exercise routine.  1/28/25- The patient   2/4/25- 6-7 on the R and  and 7-8/10 in the lower back, no change in pain after her last visit.   2/11- The patient has increased pain after being sick with bronchitis,  L rib cage pain. 7/10 pain in the neck and back  2/18/25- The patient is feeling a little   2/27/25-- The patient is feeling a little better this week,  6/10 in the neck and the lower back  4/10 on L lower back  3/4/25- The patient is feeling is neck pain 6/10 more on the R and lower back pain 5/10 on the L  3/11/25- The patient is feeling 7/10 in the L neck and L side of the of the lower back    - The patient reports improvements for only a short time before pain is 7/10 in the neck and 8/10 in the lower back    Back Pain  Associated symptoms include headaches.   Neck Pain   Associated symptoms include headaches.     Past Medical History:   Diagnosis Date   • Anemia    • Anxiety    • Deviated septum    • Frequent urination    • Gastritis    • Headache    • Hiatal hernia     • History of chickenpox    • Post partum depression 06/30/2021   • Reflux esophagitis    • Sinusitis    • Wears contact lenses       The following portions of the patient's history were reviewed and updated as appropriate: allergies, past family history, past medical history, past social history, past surgical history, and problem list.  Review of Systems   Musculoskeletal:  Positive for back pain, myalgias, neck pain and neck stiffness.   Neurological:  Positive for headaches.     Physical Exam  Constitutional:       Appearance: Normal appearance.   HENT:      Head: Normocephalic.   Eyes:      Extraocular Movements: Extraocular movements intact.      Conjunctiva/sclera: Conjunctivae normal.      Pupils: Pupils are equal, round, and reactive to light.   Abdominal:      General: Abdomen is flat.      Palpations: Abdomen is soft.   Musculoskeletal:         General: Tenderness present. Normal range of motion.        Arms:       Cervical back: Normal range of motion. Rigidity and tenderness present.   Neurological:      Mental Status: She is alert and oriented to person, place, and time.      Gait: Gait is intact.      Deep Tendon Reflexes: Reflexes are normal and symmetric.   Psychiatric:         Attention and Perception: Attention normal.         Mood and Affect: Mood and affect normal.         Speech: Speech normal.         Behavior: Behavior normal. Behavior is cooperative.         Thought Content: Thought content normal.         Cognition and Memory: Cognition normal.         Judgment: Judgment normal.       SOFT TISSUE ASSESSMENT: Hypertonicity and tenderness palpated C5-T6 erector spinae, upper traps, lev scap, SCM, scalene, rhomboid, suboccipitals, L3-S1 QL, parapsinals, psoas, hip flexor JOINT RECTRICTIONS: C5-T6, L3-S1 ORTHO: Latia unremarkable for centralization/peripheralization; max foraminal comp elicits local np R/L; shoulder depression elicits stiffness in L upper trap; brachial plexus tension test  elicits no neural tension in R/L UE; cervical distraction elicits relieves CC, SLUMP- neg, Sitting ROOT- Neg, SLR- neg    Return in about 1 week (around 4/3/2025) for Recheck.

## 2025-04-01 ENCOUNTER — PROCEDURE VISIT (OUTPATIENT)
Age: 35
End: 2025-04-01

## 2025-04-01 VITALS
SYSTOLIC BLOOD PRESSURE: 120 MMHG | WEIGHT: 130 LBS | HEIGHT: 60 IN | DIASTOLIC BLOOD PRESSURE: 86 MMHG | BODY MASS INDEX: 25.52 KG/M2 | HEART RATE: 87 BPM

## 2025-04-01 DIAGNOSIS — M99.04 SEGMENTAL DYSFUNCTION OF SACRAL REGION: ICD-10-CM

## 2025-04-01 DIAGNOSIS — M54.2 NECK PAIN: Primary | ICD-10-CM

## 2025-04-01 DIAGNOSIS — M99.02 SEGMENTAL DYSFUNCTION OF THORACIC REGION: ICD-10-CM

## 2025-04-01 DIAGNOSIS — M99.03 SEGMENTAL DYSFUNCTION OF LUMBAR REGION: ICD-10-CM

## 2025-04-01 DIAGNOSIS — M99.01 SEGMENTAL DYSFUNCTION OF CERVICAL REGION: ICD-10-CM

## 2025-04-01 DIAGNOSIS — R10.2 PELVIC PAIN IN FEMALE: ICD-10-CM

## 2025-04-01 DIAGNOSIS — M54.59 MECHANICAL LOW BACK PAIN: ICD-10-CM

## 2025-04-01 PROCEDURE — 98941 CHIROPRACT MANJ 3-4 REGIONS: CPT | Performed by: CHIROPRACTOR

## 2025-04-01 NOTE — PROGRESS NOTES
Diagnoses and all orders for this visit:    Neck pain  -     Ambulatory referral to Spine & Pain Management; Future  -     MRI cervical spine wo contrast; Future    Segmental dysfunction of lumbar region    Mechanical low back pain    Segmental dysfunction of sacral region    Segmental dysfunction of cervical region    Segmental dysfunction of thoracic region    Pelvic pain in female      ASSESSMENT:  Pt's symptoms and exam findings consistent with mechanical neck/ubp secondary to repetitive st/sp injury, exacerbated by postural/ergonomic stressors. Pt responded well to stretches and manual mobilization of the affected spinal and myofascial tissues with increased ROM; trial of conservative tx recommended consisting of stretching, ther-ex, graded mobilization/manipulation of the affected spinal/myofascial tissues, postural/ergonomic education and take home stretches/exercises. If symptoms fail to improve with short trial of conservative care, appropriate imaging and referral will be coordinated.  -Pt has little change last visit, tolerated treatment well today with some decrease in pain and mm spasm.   - Neck pain is still improving only temporarily, at this time Cervical MRI will be ordered. Referral to Spine and pain for consult we well.     PROCEDURE CODES: 30119    TREATMENT:  Fear avoidance behavior discussion, encouraged and reassured pt that natural course of condition is to improve over time with adherence to tx plan and home care strategies. Home care recommendations: avoid bed rest, walk (but avoid trails and uneven surfaces), gradual return to activity to tolerance (avoid anything that peripheralizes symptoms), ice 20 min on/off, watch for ice burn, call if symptoms peripheralize, worsen, or neurologic deficit progresses. Ther-ex: IASTM - discussed post procedure soreness and/or ecchymosis for up to 36 hrs, applied to affected mm hypertonicities; wall arely, axial retraction, upper trap stretch, lev scap  stretch, SCM stretch, lat rows with t-band, lat pull down with t-band, abdominal bracing; greater than 15 min spent performing above mentioned ther-ex. Thoracic mobilization/manipulation: prone P-A mob; cervical mobilization/manipulation: diversified supine graded mobilization, cervical traction, prone C/T jct HVLA    HPI:  Tabatha Moe is a 34 y.o. female   Chief Complaint   Patient presents with   • Neck - Follow-up     Neck is very sore   Pain score 7        • Back Pain     Lower lumbar pain score 7    Patient states both sides        The patient presents to the office with L sided neck pain and lower back pain across the back. Both pain complaints are chronic in nature- neck pain ranges from 7-9/10 and lower back is 6-8/10. The patient has gone to Chiropractic since she was 11 but then mentions her chiropractor retired. Exacerbations-Looking down and working at [employer]. 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 The pt across the lower back, its pinching with some shooting. He has a 3 yr old and although is usually physical active at this time is not doing much exercise. Headaches- suboccipital, only frequently, No significant exercise routine.  1/28/25- The patient   2/4/25- 6-7 on the R and  and 7-8/10 in the lower back, no change in pain after her last visit.   2/11- The patient has increased pain after being sick with bronchitis,  L rib cage pain. 7/10 pain in the neck and back  2/18/25- The patient is feeling a little   2/27/25-- The patient is feeling a little better this week,  6/10 in the neck and the lower back  4/10 on L lower back  3/4/25- The patient is feeling is neck pain 6/10 more on the R and lower back pain 5/10 on the L  3/11/25- The patient is feeling 7/10 in the L neck and L side of the of the lower back    - The patient reports improvements for only a short time before pain is 7/10 in the neck and 8/10 in the lower back  4/1/25- Pt feels about the same  today 7/10 pain in the neck and lower back. Temporary improvements after treatment.    Back Pain  Associated symptoms include headaches.   Neck Pain   Associated symptoms include headaches.     Past Medical History:   Diagnosis Date   • Anemia    • Anxiety    • Deviated septum    • Frequent urination    • Gastritis    • Headache    • Hiatal hernia    • History of chickenpox    • Post partum depression 06/30/2021   • Reflux esophagitis    • Sinusitis    • Wears contact lenses       The following portions of the patient's history were reviewed and updated as appropriate: allergies, past family history, past medical history, past social history, past surgical history, and problem list.  Review of Systems   Musculoskeletal:  Positive for back pain, myalgias, neck pain and neck stiffness.   Neurological:  Positive for headaches.     Physical Exam  Constitutional:       Appearance: Normal appearance.   HENT:      Head: Normocephalic.   Eyes:      Extraocular Movements: Extraocular movements intact.      Conjunctiva/sclera: Conjunctivae normal.      Pupils: Pupils are equal, round, and reactive to light.   Abdominal:      General: Abdomen is flat.      Palpations: Abdomen is soft.   Musculoskeletal:         General: Tenderness present. Normal range of motion.        Arms:       Cervical back: Normal range of motion. Rigidity and tenderness present.   Neurological:      Mental Status: She is alert and oriented to person, place, and time.      Gait: Gait is intact.      Deep Tendon Reflexes: Reflexes are normal and symmetric.   Psychiatric:         Attention and Perception: Attention normal.         Mood and Affect: Mood and affect normal.         Speech: Speech normal.         Behavior: Behavior normal. Behavior is cooperative.         Thought Content: Thought content normal.         Cognition and Memory: Cognition normal.         Judgment: Judgment normal.       SOFT TISSUE ASSESSMENT: Hypertonicity and tenderness palpated  C5-T6 erector spinae, upper traps, lev scap, SCM, scalene, rhomboid, suboccipitals, L3-S1 QL, parapsinals, psoas, hip flexor JOINT RECTRICTIONS: C5-T6, L3-S1 ORTHO: Latia unremarkable for centralization/peripheralization; max foraminal comp elicits local np R/L; shoulder depression elicits stiffness in L upper trap; brachial plexus tension test elicits no neural tension in R/L UE; cervical distraction elicits relieves CC, SLUMP- neg, Sitting ROOT- Neg, SLR- neg    Return in about 1 week (around 4/8/2025) for Recheck.

## 2025-04-15 ENCOUNTER — PROCEDURE VISIT (OUTPATIENT)
Age: 35
End: 2025-04-15

## 2025-04-15 VITALS — BODY MASS INDEX: 25.52 KG/M2 | HEIGHT: 60 IN | WEIGHT: 130 LBS

## 2025-04-15 DIAGNOSIS — M99.02 SEGMENTAL DYSFUNCTION OF THORACIC REGION: ICD-10-CM

## 2025-04-15 DIAGNOSIS — M54.59 MECHANICAL LOW BACK PAIN: ICD-10-CM

## 2025-04-15 DIAGNOSIS — M54.2 NECK PAIN: Primary | ICD-10-CM

## 2025-04-15 DIAGNOSIS — M99.03 SEGMENTAL DYSFUNCTION OF LUMBAR REGION: ICD-10-CM

## 2025-04-15 DIAGNOSIS — M99.01 SEGMENTAL DYSFUNCTION OF CERVICAL REGION: ICD-10-CM

## 2025-04-15 DIAGNOSIS — M99.04 SEGMENTAL DYSFUNCTION OF SACRAL REGION: ICD-10-CM

## 2025-04-15 DIAGNOSIS — R10.2 PELVIC PAIN IN FEMALE: ICD-10-CM

## 2025-04-15 PROCEDURE — 98941 CHIROPRACT MANJ 3-4 REGIONS: CPT | Performed by: CHIROPRACTOR

## 2025-04-15 NOTE — PROGRESS NOTES
Diagnoses and all orders for this visit:    Neck pain    Segmental dysfunction of lumbar region    Mechanical low back pain    Segmental dysfunction of sacral region    Segmental dysfunction of cervical region    Segmental dysfunction of thoracic region    Pelvic pain in female      ASSESSMENT:  Pt's symptoms and exam findings consistent with mechanical neck/ubp secondary to repetitive st/sp injury, exacerbated by postural/ergonomic stressors. Pt responded well to stretches and manual mobilization of the affected spinal and myofascial tissues with increased ROM; trial of conservative tx recommended consisting of stretching, ther-ex, graded mobilization/manipulation of the affected spinal/myofascial tissues, postural/ergonomic education and take home stretches/exercises. If symptoms fail to improve with short trial of conservative care, appropriate imaging and referral will be coordinated.  -Pt has little change last visit, tolerated treatment well today with some decrease in pain and mm spasm.   - Neck pain is still improving only temporarily, at this time Cervical MRI will be ordered. Referral to [facility] for consult we well.     PROCEDURE CODES: 81241    TREATMENT:  Fear avoidance behavior discussion, encouraged and reassured pt that natural course of condition is to improve over time with adherence to tx plan and home care strategies. Home care recommendations: avoid bed rest, walk (but avoid trails and uneven surfaces), gradual return to activity to tolerance (avoid anything that peripheralizes symptoms), ice 20 min on/off, watch for ice burn, call if symptoms peripheralize, worsen, or neurologic deficit progresses. Ther-ex: IASTM - discussed post procedure soreness and/or ecchymosis for up to 36 hrs, applied to affected mm hypertonicities; wall arely, axial retraction, upper trap stretch, lev scap stretch, SCM stretch, lat rows with t-band, lat pull down with t-band, abdominal bracing; greater than 15 min  spent performing above mentioned ther-ex. Thoracic mobilization/manipulation: prone P-A mob; cervical mobilization/manipulation: diversified supine graded mobilization, cervical traction, prone C/T jct HVLA    HPI:  Tabatha Moe is a 34 y.o. female   Chief Complaint   Patient presents with   • Neck - Follow-up     Neck is very sore   pain score 8     • Back Pain     Lower lumbar is sore but better   pain score 4-5         The patient presents to the office with L sided neck pain and lower back pain across the back. Both pain complaints are chronic in nature- neck pain ranges from 7-9/10 and lower back is 6-8/10. The patient has gone to Chiropractic since she was 11 but then mentions her chiropractor retired. Exacerbations-Looking down and working at [employer]. 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 The pt across the lower back, its pinching with some shooting. He has a 3 yr old and although is usually physical active at this time is not doing much exercise. Headaches- suboccipital, only frequently, No significant exercise routine.  1/28/25- The patient   2/4/25- 6-7 on the R and  and 7-8/10 in the lower back, no change in pain after her last visit.   2/11- The patient has increased pain after being sick with bronchitis,  L rib cage pain. 7/10 pain in the neck and back  2/18/25- The patient is feeling a little   2/27/25-- The patient is feeling a little better this week,  6/10 in the neck and the lower back  4/10 on L lower back  3/4/25- The patient is feeling is neck pain 6/10 more on the R and lower back pain 5/10 on the L  3/11/25- The patient is feeling 7/10 in the L neck and L side of the of the lower back    - The patient reports improvements for only a short time before pain is 7/10 in the neck and 8/10 in the lower back  4/1/25- Pt feels about the same today 7/10 pain in the neck and lower back. Temporary improvements after treatment.  4/15/25- The patient with increases  neck pain 8/10 and the lower 4-5/10.    Back Pain  Associated symptoms include headaches.   Neck Pain   Associated symptoms include headaches.     Past Medical History:   Diagnosis Date   • Anemia    • Anxiety    • Deviated septum    • Frequent urination    • Gastritis    • Headache    • Hiatal hernia    • History of chickenpox    • Post partum depression 06/30/2021   • Reflux esophagitis    • Sinusitis    • Wears contact lenses       The following portions of the patient's history were reviewed and updated as appropriate: allergies, past family history, past medical history, past social history, past surgical history, and problem list.  Review of Systems   Musculoskeletal:  Positive for back pain, myalgias, neck pain and neck stiffness.   Neurological:  Positive for headaches.     Physical Exam  Constitutional:       Appearance: Normal appearance.   HENT:      Head: Normocephalic.   Abdominal:      General: Abdomen is flat.      Palpations: Abdomen is soft.   Musculoskeletal:         General: Tenderness present. Normal range of motion.        Arms:       Cervical back: Normal range of motion. Rigidity and tenderness present.   Neurological:      Mental Status: She is alert and oriented to person, place, and time.      Gait: Gait is intact.      Deep Tendon Reflexes: Reflexes are normal and symmetric.   Psychiatric:         Attention and Perception: Attention normal.         Mood and Affect: Mood and affect normal.         Speech: Speech normal.         Behavior: Behavior normal. Behavior is cooperative.         Thought Content: Thought content normal.         Cognition and Memory: Cognition normal.         Judgment: Judgment normal.       SOFT TISSUE ASSESSMENT: Hypertonicity and tenderness palpated C5-T6 erector spinae, upper traps, lev scap, SCM, scalene, rhomboid, suboccipitals, L3-S1 QL, parapsinals, psoas, hip flexor JOINT RECTRICTIONS: C5-T6, L3-S1 ORTHO: Latia unremarkable for  centralization/peripheralization; max foraminal comp elicits local np R/L; shoulder depression elicits stiffness in L upper trap; brachial plexus tension test elicits no neural tension in R/L UE; cervical distraction elicits relieves CC, SLUMP- neg, Sitting ROOT- Neg, SLR- neg    Return in about 1 week (around 4/22/2025) for Recheck.

## 2025-04-29 ENCOUNTER — CONSULT (OUTPATIENT)
Dept: PAIN MEDICINE | Facility: CLINIC | Age: 35
End: 2025-04-29
Payer: COMMERCIAL

## 2025-04-29 VITALS
SYSTOLIC BLOOD PRESSURE: 155 MMHG | HEART RATE: 89 BPM | BODY MASS INDEX: 26.11 KG/M2 | WEIGHT: 133 LBS | HEIGHT: 60 IN | DIASTOLIC BLOOD PRESSURE: 103 MMHG

## 2025-04-29 DIAGNOSIS — M54.12 CERVICAL RADICULOPATHY: ICD-10-CM

## 2025-04-29 DIAGNOSIS — M79.18 BILATERAL BUTTOCK PAIN: ICD-10-CM

## 2025-04-29 DIAGNOSIS — M46.1 SACROILIITIS (HCC): ICD-10-CM

## 2025-04-29 DIAGNOSIS — M53.3 TRAUMATIC COCCYDYNIA: Primary | ICD-10-CM

## 2025-04-29 DIAGNOSIS — M54.2 NECK PAIN: ICD-10-CM

## 2025-04-29 PROCEDURE — 99204 OFFICE O/P NEW MOD 45 MIN: CPT | Performed by: PHYSICAL MEDICINE & REHABILITATION

## 2025-04-29 RX ORDER — METHOCARBAMOL 500 MG/1
500 TABLET, FILM COATED ORAL 2 TIMES DAILY PRN
Qty: 40 TABLET | Refills: 0 | Status: SHIPPED | OUTPATIENT
Start: 2025-04-29

## 2025-04-29 NOTE — PROGRESS NOTES
Assessment  1. Traumatic coccydynia    2. Neck pain    3. Bilateral buttock pain    4. Cervical radiculopathy    5. Sacroiliitis (HCC)        Plan  Ms. Moe is a pleasant 34-year-old female who presents to [facility] for initial evaluation regarding neck pain with radiating symptoms into the left upper extremity as well as mid to low back and buttock pain.  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  In regards to the neck that started several months ago with worsening radicular pain into the left arm and has had moderate relief with chiropractic therapy and is now pending an MRI cervical spine to be done May 17, 2025.  She is demonstrating clinical evidence of cervical radiculopathy but we will now need to wait for the MRI results prior to any interventional consideration.  In regards to the back and buttock pain, suspecting traumatic coccydynia and we will plan for a coccyx injection  Under fluoroscopy guidance.  Will order updated sacral and coccyx x-rays.  We did discuss possible alternative reasons for the low back and buttock pain including bilateral sacroiliac joint pain that may be contributing.  For now we will start with a coccyx injection and reevaluate afterwards.  All questions answered, patient is agreeable with plan.    Complete risks and benefits including bleeding, infection, tissue reaction, nerve injury and allergic reaction were discussed. The approach was demonstrated using models and literature was provided. Verbal and written consent was obtained.    My impressions and treatment recommendations were discussed in detail with the patient who verbalized understanding and had no further questions.  Discharge instructions were provided. I personally saw and examined the patient and I agree with the above discussed plan of care.    Orders Placed This Encounter  Procedures    XR sacrum and coccyx     Standing Status:   Future     Expected Date:   4/29/2025     Expiration Date:   4/29/2029     Scheduling Instructions:      Bring along any outside films relating to this procedure.           Is the patient pregnant?:   No    FL spine and pain procedure     Standing Status:   Future     Expected Date:   4/29/2025     Expiration Date:   4/29/2029     Reason for Exam::   coccyx inj     Is the patient pregnant?:   No     Anticoagulant hold needed?:   No     New Medications Ordered This Visit   Medications    methocarbamol (ROBAXIN) 500 mg tablet     Sig: Take 1 tablet (500 mg total) by mouth 2 (two) times a day as needed for muscle spasms     Dispense:  40 tablet     Refill:  0       History of Present Illness    Tabatha Moe is a 34 y.o. female presents to [facility] for initial evaluation regarding neck and mid to low back pain currently rated 7-8 out of 10 and greatly impacting her quality of life and actives of daily living.  Reports she had a nonwork related injury multiple years ago and continues to see Dr. Luque regarding the ongoing pain.  States symptoms are severe and constant 100% of the time described as a throbbing, shooting, aching sensation.  Denies any significant upper extremity weakness or dropping objects.  Does not use any durable medical equipment fibrillation.  Symptoms are worse with lying down, standing, bending, sitting, bowel movements and menstruation.  Currently using lidocaine and ibuprofen with no relief.  Chiropractic therapy has provided moderate relief as well as heat.  Presents today for initial evaluation.    I have personally reviewed and/or updated the patient's past medical history, past surgical history, family history, social history, current medications, allergies, and vital signs today.     Review of Systems   Constitutional:  Negative for fever and unexpected weight change.   HENT:  Negative for trouble  swallowing.    Eyes:  Negative for visual disturbance.   Respiratory:  Negative for shortness of breath and wheezing.    Cardiovascular:  Negative for chest pain and palpitations.   Gastrointestinal:  Negative for constipation, diarrhea, nausea and vomiting.   Endocrine: Negative for cold intolerance, heat intolerance and polydipsia.   Genitourinary:  Negative for difficulty urinating and frequency.   Musculoskeletal:  Positive for back pain and neck pain. Negative for arthralgias, gait problem, joint swelling and myalgias.   Skin:  Negative for rash.   Neurological:  Negative for dizziness, seizures, syncope, weakness and headaches.   Hematological:  Does not bruise/bleed easily.   Psychiatric/Behavioral:  Negative for dysphoric mood.    All other systems reviewed and are negative.      Patient Active Problem List   Diagnosis    Allergic rhinitis    Anemia    Anxiety state    Laryngopharyngeal reflux (LPR)    Generalized anxiety disorder    Panic disorder    Hiatal hernia    History of seasonal allergies    Preeclampsia in postpartum period    Right axis deviation    Chronic seasonal allergic rhinitis due to pollen    Allergic rhinitis due to house dust mite       Past Medical History:   Diagnosis Date    Anemia     Anxiety     Deviated septum     Frequent urination     Gastritis     Headache     Hiatal hernia     History of chickenpox     Post partum depression 2021    Reflux esophagitis     Sinusitis     Wears contact lenses        Past Surgical History:   Procedure Laterality Date     SECTION  2021    CYST REMOVAL      TOOTH EXTRACTION      WISDOM TOOTH EXTRACTION         Family History   Problem Relation Age of Onset    Hydrocephalus Mother         Shunt head    Anemia Mother     Migraines Mother     Osteopenia Mother     Bipolar disorder Father     Schizoaffective Disorder  Father     Heart disease Father     Hypertension Father     No Known Problems Sister     No Known Problems Son         Social History     Occupational History    Occupation: Teacher   Tobacco Use    Smoking status: Some Days     Types: Cigarettes     Start date: 1/1/2017    Smokeless tobacco: Never    Tobacco comments:     Couple cigarettes 2 x a month    Vaping Use    Vaping status: Some Days   Substance and Sexual Activity    Alcohol use: Yes     Comment: 1-2 times a month    Drug use: Never    Sexual activity: Not on file       Current Outpatient Medications on File Prior to Visit   Medication Sig    albuterol (PROVENTIL HFA,VENTOLIN HFA) 90 mcg/act inhaler Inhale 2 puffs every 6 (six) hours as needed    buPROPion (WELLBUTRIN) 75 mg tablet Take 75 mg by mouth 2 (two) times a day    calcium carbonate (Tums) 500 mg chewable tablet     Jessica Oil-Levomenthol (FDGARD PO) Take by mouth    famotidine (Pepcid) 20 mg tablet Take 20 mg by mouth as needed    ferrous sulfate 325 (65 Fe) mg tablet 325 mg daily with breakfast    Licorice, Glycyrrhiza glabra, (LICORICE ROOT PO) Take by mouth    mometasone (NASONEX) 50 mcg/act nasal spray 2 sprays into each nostril daily    norgestimate-ethinyl estradiol (ORTHO TRI-CYCLEN,TRINESSA) 0.18/0.215/0.25 MG-35 MCG per tablet Take 1 tablet by mouth daily    Olopatadine HCl 0.6 % SOLN 2 actuation into each nostril daily    promethazine-dextromethorphan (PHENERGAN-DM) 6.25-15 mg/5 mL oral syrup Take 5 mL by mouth 4 (four) times a day as needed    Simethicone 125 MG TABS if needed    aluminum-magnesium hydroxide 200-200 MG/5ML suspension GAVISCON REGULAR STRENGTH AFTER MEALS and BEDTIME WHEN NOT FOLLOWING LPR/GERD DIET. (Patient not taking: Reported on 2/4/2025)    ferrous sulfate 325 (65 Fe) mg tablet Take 325 mg by mouth daily with breakfast (Patient not taking: Reported on 2/4/2025)     No current facility-administered medications on file prior to visit.       Allergies   Allergen Reactions    Corylus Sneezing    Dust Mite Extract Sneezing    Mixed Ragweed Sneezing    Molds & Smuts Sneezing     Dc [place] Allergy Skin Test Sneezing       Physical Exam    BP (!) 155/103 (BP Location: Right arm, Patient Position: Sitting, Cuff Size: Standard)   Pulse 89   Ht 5' (1.524 m)   Wt 60.3 kg (133 lb)   BMI 25.97 kg/m²     Constitutional: normal, well developed, well nourished, alert, in no distress and non-toxic and no overt pain behavior.  Eyes: anicteric  HEENT: grossly intact  Neck: supple, symmetric, trachea midline and no masses   Pulmonary:even and unlabored  Cardiovascular:No edema or pitting edema present  Skin:Normal without rashes or lesions and well hydrated  Psychiatric:Mood and affect appropriate  Neurologic:Cranial Nerves II-XII grossly intact  Musculoskeletal:normal gait  Neck exam  Tenderness to palpation left side cervical paraspinals, decreased active and passive range of motion cervical flexion limited by pain, positive Spurling with radicular symptoms into the left arm, MMT 5 out of 5 bilateral upper extremities, sensation grossly intact to light touch, DTRs within normal limits    Back exam  Tenderness to palpation midline coccyx and sacrum as well as bilateral lumbar paraspinals and distal to PSIS, MMT 5 out of 5 bilateral lower extremities, sensation grossly intact to light touch, negative straight leg raise bilaterally,  POSITIVE Sacral compression testing bilaterally  POSITIVE Balta's test bilaterally  POSITIVE thigh thrust bilaterally    Imaging

## 2025-05-01 ENCOUNTER — PROCEDURE VISIT (OUTPATIENT)
Age: 35
End: 2025-05-01

## 2025-05-01 ENCOUNTER — HOSPITAL ENCOUNTER (OUTPATIENT)
Dept: RADIOLOGY | Facility: HOSPITAL | Age: 35
Discharge: HOME | End: 2025-05-01
Payer: COMMERCIAL

## 2025-05-01 VITALS
BODY MASS INDEX: 26.11 KG/M2 | WEIGHT: 133 LBS | HEIGHT: 60 IN | DIASTOLIC BLOOD PRESSURE: 87 MMHG | SYSTOLIC BLOOD PRESSURE: 142 MMHG

## 2025-05-01 DIAGNOSIS — M46.1 SACROILIITIS (HCC): ICD-10-CM

## 2025-05-01 DIAGNOSIS — M54.12 CERVICAL RADICULOPATHY: ICD-10-CM

## 2025-05-01 DIAGNOSIS — M54.2 NECK PAIN: ICD-10-CM

## 2025-05-01 DIAGNOSIS — M53.3 TRAUMATIC COCCYDYNIA: ICD-10-CM

## 2025-05-01 DIAGNOSIS — M54.2 NECK PAIN: Primary | ICD-10-CM

## 2025-05-01 DIAGNOSIS — M79.18 BILATERAL BUTTOCK PAIN: ICD-10-CM

## 2025-05-01 DIAGNOSIS — M54.59 MECHANICAL LOW BACK PAIN: ICD-10-CM

## 2025-05-01 DIAGNOSIS — R10.2 PELVIC PAIN IN FEMALE: ICD-10-CM

## 2025-05-01 DIAGNOSIS — M99.02 SEGMENTAL DYSFUNCTION OF THORACIC REGION: ICD-10-CM

## 2025-05-01 DIAGNOSIS — M99.01 SEGMENTAL DYSFUNCTION OF CERVICAL REGION: ICD-10-CM

## 2025-05-01 DIAGNOSIS — M99.03 SEGMENTAL DYSFUNCTION OF LUMBAR REGION: ICD-10-CM

## 2025-05-01 DIAGNOSIS — M99.04 SEGMENTAL DYSFUNCTION OF SACRAL REGION: ICD-10-CM

## 2025-05-01 PROCEDURE — 98941 CHIROPRACT MANJ 3-4 REGIONS: CPT | Performed by: CHIROPRACTOR

## 2025-05-01 PROCEDURE — 72220 X-RAY EXAM SACRUM TAILBONE: CPT

## 2025-05-01 NOTE — PROGRESS NOTES
Diagnoses and all orders for this visit:    Neck pain    Segmental dysfunction of lumbar region    Mechanical low back pain    Segmental dysfunction of sacral region    Segmental dysfunction of cervical region    Segmental dysfunction of thoracic region    Pelvic pain in female      ASSESSMENT:  Pt's symptoms and exam findings consistent with mechanical neck/ubp secondary to repetitive st/sp injury, exacerbated by postural/ergonomic stressors. Pt responded well to stretches and manual mobilization of the affected spinal and myofascial tissues with increased ROM; trial of conservative tx recommended consisting of stretching, ther-ex, graded mobilization/manipulation of the affected spinal/myofascial tissues, postural/ergonomic education and take home stretches/exercises. If symptoms fail to improve with short trial of conservative care, appropriate imaging and referral will be coordinated.  -Pt has little change last visit, tolerated treatment well today with some decrease in pain and mm spasm.   - Neck pain is still improving only temporarily, at this time Cervical MRI will be ordered. Referral to [facility] for consult we well.   5/1/25- Flare up in L neck pain, tolerated treatment fairly well. MRI is coming up and recently had consult with Dr. Mcnamara [facility].     PROCEDURE CODES: 22337    TREATMENT:  Fear avoidance behavior discussion, encouraged and reassured pt that natural course of condition is to improve over time with adherence to tx plan and home care strategies. Home care recommendations: avoid bed rest, walk (but avoid trails and uneven surfaces), gradual return to activity to tolerance (avoid anything that peripheralizes symptoms), ice 20 min on/off, watch for ice burn, call if symptoms peripheralize, worsen, or neurologic deficit progresses. Ther-ex: IASTM - discussed post procedure soreness and/or ecchymosis for up to 36 hrs, applied to affected mm hypertonicities; wall arely, axial  retraction, upper trap stretch, lev scap stretch, SCM stretch, lat rows with t-band, lat pull down with t-band, abdominal bracing; greater than 15 min spent performing above mentioned ther-ex. Thoracic mobilization/manipulation: prone P-A mob; cervical mobilization/manipulation: diversified supine graded mobilization, cervical traction, prone C/T jct HVLA    HPI:  Tabatha Moe is a 34 y.o. female   Chief Complaint   Patient presents with   • Neck Pain     Neck pain about a 7 left side tweaked it    • Back Pain     Low back about a 6      The patient presents to the office with L sided neck pain and lower back pain across the back. Both pain complaints are chronic in nature- neck pain ranges from 7-9/10 and lower back is 6-8/10. The patient has gone to Chiropractic since she was 11 but then mentions her chiropractor retired. Exacerbations-Looking down and working at [employer]. 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 The pt across the lower back, its pinching with some shooting. He has a 3 yr old and although is usually physical active at this time is not doing much exercise. Headaches- suboccipital, only frequently, No significant exercise routine.  1/28/25- The patient   2/4/25- 6-7 on the R and  and 7-8/10 in the lower back, no change in pain after her last visit.   2/11- The patient has increased pain after being sick with bronchitis,  L rib cage pain. 7/10 pain in the neck and back  2/18/25- The patient is feeling a little   2/27/25-- The patient is feeling a little better this week,  6/10 in the neck and the lower back  4/10 on L lower back  3/4/25- The patient is feeling is neck pain 6/10 more on the R and lower back pain 5/10 on the L  3/11/25- The patient is feeling 7/10 in the L neck and L side of the of the lower back    - The patient reports improvements for only a short time before pain is 7/10 in the neck and 8/10 in the lower back  4/1/25- Pt feels about the same  today 7/10 pain in the neck and lower back. Temporary improvements after treatment.  4/15/25- The patient with increases neck pain 8/10 and the lower 4-5/10.  5/1/25- The patient reports she tweaked L neck so its up to a 7/10. She has MRI coming up and her consult with [facility] with Dr. Mcnamara went well. Since we are still waiting on Cervical MRI, Dr. Mcnamara will be addressing the patient's chronic coccyx pain first.     Back Pain  Associated symptoms include headaches.   Neck Pain   Associated symptoms include headaches.     Past Medical History:   Diagnosis Date   • Anemia    • Anxiety    • Deviated septum    • Frequent urination    • Gastritis    • Headache    • Hiatal hernia    • History of chickenpox    • Post partum depression 06/30/2021   • Reflux esophagitis    • Sinusitis    • Wears contact lenses       The following portions of the patient's history were reviewed and updated as appropriate: allergies, past family history, past medical history, past social history, past surgical history, and problem list.  Review of Systems   Musculoskeletal:  Positive for back pain, myalgias, neck pain and neck stiffness.   Neurological:  Positive for headaches.     Physical Exam  Constitutional:       Appearance: Normal appearance.   HENT:      Head: Normocephalic.   Abdominal:      General: Abdomen is flat.      Palpations: Abdomen is soft.   Musculoskeletal:         General: Tenderness present. Normal range of motion.        Arms:       Cervical back: Normal range of motion. Rigidity and tenderness present.   Neurological:      Mental Status: She is alert and oriented to person, place, and time.      Gait: Gait is intact.      Deep Tendon Reflexes: Reflexes are normal and symmetric.   Psychiatric:         Attention and Perception: Attention normal.         Mood and Affect: Mood and affect normal.         Speech: Speech normal.         Behavior: Behavior normal. Behavior is cooperative.         Thought Content:  Thought content normal.         Cognition and Memory: Cognition normal.         Judgment: Judgment normal.       SOFT TISSUE ASSESSMENT: Hypertonicity and tenderness palpated C5-T6 erector spinae, upper traps, lev scap, SCM, scalene, rhomboid, suboccipitals, L3-S1 QL, parapsinals, psoas, hip flexor JOINT RECTRICTIONS: C5-T6, L3-S1 ORTHO: Latia unremarkable for centralization/peripheralization; max foraminal comp elicits local np R/L; shoulder depression elicits stiffness in L upper trap; brachial plexus tension test elicits no neural tension in R/L UE; cervical distraction elicits relieves CC, SLUMP- neg, Sitting ROOT- Neg, SLR- neg    Return in about 1 week (around 5/8/2025) for Recheck.

## 2025-05-17 ENCOUNTER — HOSPITAL ENCOUNTER (OUTPATIENT)
Dept: MRI IMAGING | Facility: HOSPITAL | Age: 35
Discharge: HOME | End: 2025-05-17
Attending: CHIROPRACTOR
Payer: COMMERCIAL

## 2025-05-17 DIAGNOSIS — M54.2 NECK PAIN: ICD-10-CM

## 2025-05-17 PROCEDURE — 72141 MRI NECK SPINE W/O DYE: CPT

## 2025-05-27 ENCOUNTER — RESULTS FOLLOW-UP (OUTPATIENT)
Age: 35
End: 2025-05-27

## 2025-05-29 ENCOUNTER — OFFICE VISIT (OUTPATIENT)
Age: 35
End: 2025-05-29

## 2025-05-29 VITALS
SYSTOLIC BLOOD PRESSURE: 148 MMHG | HEIGHT: 60 IN | BODY MASS INDEX: 26.11 KG/M2 | DIASTOLIC BLOOD PRESSURE: 74 MMHG | HEART RATE: 78 BPM | WEIGHT: 133 LBS

## 2025-05-29 DIAGNOSIS — M99.01 SEGMENTAL DYSFUNCTION OF CERVICAL REGION: ICD-10-CM

## 2025-05-29 DIAGNOSIS — M54.2 NECK PAIN: Primary | ICD-10-CM

## 2025-05-29 DIAGNOSIS — M54.59 MECHANICAL LOW BACK PAIN: ICD-10-CM

## 2025-05-29 DIAGNOSIS — M99.03 SEGMENTAL DYSFUNCTION OF LUMBAR REGION: ICD-10-CM

## 2025-05-29 DIAGNOSIS — M99.04 SEGMENTAL DYSFUNCTION OF SACRAL REGION: ICD-10-CM

## 2025-05-29 DIAGNOSIS — M99.02 SEGMENTAL DYSFUNCTION OF THORACIC REGION: ICD-10-CM

## 2025-05-29 PROCEDURE — 98941 CHIROPRACT MANJ 3-4 REGIONS: CPT | Performed by: CHIROPRACTOR

## 2025-05-29 NOTE — PROGRESS NOTES
Diagnoses and all orders for this visit:    Neck pain    Segmental dysfunction of lumbar region    Segmental dysfunction of sacral region    Mechanical low back pain    Segmental dysfunction of cervical region    Segmental dysfunction of thoracic region      ASSESSMENT:  Pt's symptoms and exam findings consistent with mechanical neck/ubp secondary to repetitive st/sp injury, exacerbated by postural/ergonomic stressors. Pt responded well to stretches and manual mobilization of the affected spinal and myofascial tissues with increased ROM; trial of conservative tx recommended consisting of stretching, ther-ex, graded mobilization/manipulation of the affected spinal/myofascial tissues, postural/ergonomic education and take home stretches/exercises. If symptoms fail to improve with short trial of conservative care, appropriate imaging and referral will be coordinated.  -Pt has little change last visit, tolerated treatment well today with some decrease in pain and mm spasm.   - Neck pain is still improving only temporarily, at this time Cervical MRI will be ordered. Referral to [facility] for consult we well.   5/1/25- Flare up in L neck pain, tolerated treatment fairly well. MRI is coming up and recently had consult with [facility]   5/29/25- The patient is feeling flare up with treatment and pain and mm spasm    PROCEDURE CODES: 83992    TREATMENT:  Fear avoidance behavior discussion, encouraged and reassured pt that natural course of condition is to improve over time with adherence to tx plan and home care strategies. Home care recommendations: avoid bed rest, walk (but avoid trails and uneven surfaces), gradual return to activity to tolerance (avoid anything that peripheralizes symptoms), ice 20 min on/off, watch for ice burn, call if symptoms peripheralize, worsen, or neurologic deficit progresses. Ther-ex: IASTM - discussed post procedure soreness and/or ecchymosis for up to 36 hrs, applied to  affected mm hypertonicities; wall arely, axial retraction, upper trap stretch, lev scap stretch, SCM stretch, lat rows with t-band, lat pull down with t-band, abdominal bracing; greater than 15 min spent performing above mentioned ther-ex. Thoracic mobilization/manipulation: prone P-A mob; cervical mobilization/manipulation: diversified supine graded mobilization, cervical traction, prone C/T jct HVLA    HPI:  Tabatha Moe is a 34 y.o. female   Chief Complaint   Patient presents with   • Neck - Pain     Neck pain score 8       • Back Pain      Mid to Lower lumbar pain score   6-7        The patient presents to the office with L sided neck pain and lower back pain across the back. Both pain complaints are chronic in nature- neck pain ranges from 7-9/10 and lower back is 6-8/10. The patient has gone to Chiropractic since she was 11 but then mentions her chiropractor retired. Exacerbations-Looking down and working at computer. 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 The pt across the lower back, its pinching with some shooting. He has a 3 yr old and although is usually physical active at this time is not doing much exercise. Headaches- suboccipital, only frequently, No significant exercise routine.  1/28/25- The patient   2/4/25- 6-7 on the R and  and 7-8/10 in the lower back, no change in pain after her last visit.   2/11- The patient has increased pain after being sick with bronchitis,  L rib cage pain. 7/10 pain in the neck and back  2/18/25- The patient is feeling a little   2/27/25-- The patient is feeling a little better this week,  6/10 in the neck and the lower back  4/10 on L lower back  3/4/25- The patient is feeling is neck pain 6/10 more on the R and lower back pain 5/10 on the L  3/11/25- The patient is feeling 7/10 in the L neck and L side of the of the lower back    - The patient reports improvements for only a short time before pain is 7/10 in the neck and 8/10 in  "the lower back  4/1/25- Pt feels about the same today 7/10 pain in the neck and lower back. Temporary improvements after treatment.  4/15/25- The patient with increases neck pain 8/10 and the lower 4-5/10.  5/1/25- The patient reports she tweaked L neck so its up to a 7/10. She has MRI coming up and her consult with [facility] with Dr. Mcnamara went well. Since we are still waiting on Cervical MRI, Dr. Mcnamara will be addressing the patient's chronic coccyx pain first.   5/29/25- The pt was feeling overall \"ok\" but then she forgot her age and did flip and had increased pain    Back Pain  Associated symptoms include headaches.   Neck Pain   Associated symptoms include headaches.     Past Medical History:   Diagnosis Date   • Anemia    • Anxiety    • Deviated septum    • Frequent urination    • Gastritis    • Headache    • Hiatal hernia    • History of chickenpox    • Post partum depression 06/30/2021   • Reflux esophagitis    • Sinusitis    • Wears contact lenses       The following portions of the patient's history were reviewed and updated as appropriate: allergies, past family history, past medical history, past social history, past surgical history, and problem list.  Review of Systems   Musculoskeletal:  Positive for back pain, myalgias, neck pain and neck stiffness.   Neurological:  Positive for headaches.     Physical Exam  Constitutional:       Appearance: Normal appearance.   HENT:      Head: Normocephalic.   Abdominal:      General: Abdomen is flat.      Palpations: Abdomen is soft.     Musculoskeletal:         General: Tenderness present. Normal range of motion.        Arms:       Cervical back: Normal range of motion. Rigidity and tenderness present.     Neurological:      Mental Status: She is alert and oriented to person, place, and time.      Gait: Gait is intact.      Deep Tendon Reflexes: Reflexes are normal and symmetric.     Psychiatric:         Attention and Perception: Attention normal.         " Mood and Affect: Mood and affect normal.         Speech: Speech normal.         Behavior: Behavior normal. Behavior is cooperative.         Thought Content: Thought content normal.         Cognition and Memory: Cognition normal.         Judgment: Judgment normal.       SOFT TISSUE ASSESSMENT: Hypertonicity and tenderness palpated C5-T6 erector spinae, upper traps, lev scap, SCM, scalene, rhomboid, suboccipitals, L3-S1 QL, parapsinals, psoas, hip flexor JOINT RECTRICTIONS: C5-T6, L3-S1 ORTHO: Latia unremarkable for centralization/peripheralization; max foraminal comp elicits local np R/L; shoulder depression elicits stiffness in L upper trap; brachial plexus tension test elicits no neural tension in R/L UE; cervical distraction elicits relieves CC, SLUMP- neg, Sitting ROOT- Neg, SLR- neg    Return in about 1 week (around 6/5/2025) for Recheck.

## 2025-06-03 ENCOUNTER — TELEPHONE (OUTPATIENT)
Dept: PAIN MEDICINE | Facility: CLINIC | Age: 35
End: 2025-06-03

## 2025-06-04 ENCOUNTER — HOSPITAL ENCOUNTER (OUTPATIENT)
Dept: RADIOLOGY | Facility: HOSPITAL | Age: 35
Discharge: HOME | End: 2025-06-04
Attending: PHYSICAL MEDICINE & REHABILITATION
Payer: COMMERCIAL

## 2025-06-04 VITALS
DIASTOLIC BLOOD PRESSURE: 81 MMHG | RESPIRATION RATE: 17 BRPM | HEART RATE: 17 BPM | SYSTOLIC BLOOD PRESSURE: 129 MMHG | TEMPERATURE: 98 F | OXYGEN SATURATION: 98 %

## 2025-06-04 DIAGNOSIS — M46.1 SACROILIITIS (HCC): ICD-10-CM

## 2025-06-04 DIAGNOSIS — M54.2 NECK PAIN: ICD-10-CM

## 2025-06-04 DIAGNOSIS — M54.12 CERVICAL RADICULOPATHY: ICD-10-CM

## 2025-06-04 DIAGNOSIS — M53.3 TRAUMATIC COCCYDYNIA: ICD-10-CM

## 2025-06-04 DIAGNOSIS — M79.18 BILATERAL BUTTOCK PAIN: ICD-10-CM

## 2025-06-04 PROCEDURE — 77002 NEEDLE LOCALIZATION BY XRAY: CPT | Performed by: PHYSICAL MEDICINE & REHABILITATION

## 2025-06-04 PROCEDURE — 20605 DRAIN/INJ JOINT/BURSA W/O US: CPT | Performed by: PHYSICAL MEDICINE & REHABILITATION

## 2025-06-04 RX ORDER — ROPIVACAINE HYDROCHLORIDE 2 MG/ML
2 INJECTION, SOLUTION EPIDURAL; INFILTRATION; PERINEURAL ONCE
Status: COMPLETED | OUTPATIENT
Start: 2025-06-04 | End: 2025-06-04

## 2025-06-04 RX ORDER — METHYLPREDNISOLONE ACETATE 40 MG/ML
40 INJECTION, SUSPENSION INTRA-ARTICULAR; INTRALESIONAL; INTRAMUSCULAR; PARENTERAL; SOFT TISSUE ONCE
Status: COMPLETED | OUTPATIENT
Start: 2025-06-04 | End: 2025-06-04

## 2025-06-04 RX ADMIN — ROPIVACAINE HYDROCHLORIDE 2 ML: 2 INJECTION, SOLUTION EPIDURAL; INFILTRATION at 16:00

## 2025-06-04 RX ADMIN — METHYLPREDNISOLONE ACETATE 40 MG: 40 INJECTION, SUSPENSION INTRA-ARTICULAR; INTRALESIONAL; INTRAMUSCULAR; SOFT TISSUE at 16:00

## 2025-06-04 RX ADMIN — IOHEXOL 1 ML: 300 INJECTION, SOLUTION INTRAVENOUS at 15:59

## 2025-06-04 NOTE — H&P
History of Present Illness: The patient is a 34 y.o. female who presents with complaints of buttock pain    Past Medical History[1]    Past Surgical History[2]    Current Medications[3]    Allergies[4]    Physical Exam: There were no vitals filed for this visit.  General: Awake, Alert, Oriented x 3, Mood and affect appropriate  Respiratory: Respirations even and unlabored  Cardiovascular: Peripheral pulses intact; no edema  Musculoskeletal Exam: coccyx tenderness    ASA Score: 2         Assessment:   1. Neck pain    2. Traumatic coccydynia    3. Bilateral buttock pain    4. Cervical radiculopathy    5. Sacroiliitis (HCC)        Plan: coccyx inj        [1]   Past Medical History:  Diagnosis Date    Anemia     Anxiety     Deviated septum     Frequent urination     Gastritis     Headache     Hiatal hernia     History of chickenpox     Post partum depression 2021    Reflux esophagitis     Sinusitis     Wears contact lenses    [2]   Past Surgical History:  Procedure Laterality Date     SECTION  2021    CYST REMOVAL      TOOTH EXTRACTION      WISDOM TOOTH EXTRACTION     [3]   Current Outpatient Medications:     buPROPion (WELLBUTRIN) 75 mg tablet, Take 75 mg by mouth 2 (two) times a day, Disp: , Rfl:     calcium carbonate (Tums) 500 mg chewable tablet, , Disp: , Rfl:     Jessica Oil-Levomenthol (FDGARD PO), Take by mouth, Disp: , Rfl:     famotidine (Pepcid) 20 mg tablet, Take 20 mg by mouth as needed, Disp: , Rfl:     Licorice, Glycyrrhiza glabra, (LICORICE ROOT PO), Take by mouth, Disp: , Rfl:     methocarbamol (ROBAXIN) 500 mg tablet, Take 1 tablet (500 mg total) by mouth 2 (two) times a day as needed for muscle spasms, Disp: 40 tablet, Rfl: 0    mometasone (NASONEX) 50 mcg/act nasal spray, 2 sprays into each nostril daily, Disp: 17 g, Rfl: 11    norgestimate-ethinyl estradiol (ORTHO TRI-CYCLEN,TRINESSA) 0.18/0.215/0.25 MG-35 MCG per tablet, Take 1 tablet by mouth daily, Disp: , Rfl:     Olopatadine  HCl 0.6 % SOLN, 2 actuation into each nostril daily, Disp: 30.5 g, Rfl: 11    Simethicone 125 MG TABS, if needed, Disp: , Rfl:     Current Facility-Administered Medications:     iohexol (OMNIPAQUE) 300 mg/mL injection 1 mL, 1 mL, Intra-articular, Once, Emeka Mcnamara DO    methylPREDNISolone acetate (DEPO-MEDROL) injection 40 mg, 40 mg, Intra-articular, Once, Emeka Mcnamara DO    ropivacaine (NAROPIN) injection 2 mL, 2 mL, Intra-articular, Once, Emeka Mcnamara DO  [4]   Allergies  Allergen Reactions    Corylus Sneezing    Dust Mite Extract Sneezing    Mixed Ragweed Sneezing    Molds & Smuts Sneezing    [place] Allergy Skin Test Sneezing

## 2025-06-04 NOTE — DISCHARGE INSTR - LAB
Do not apply heat to any area that is numb. If you have discomfort or soreness at the injection site, you may apply ice today, 20 minutes on and 20 minutes off. Tomorrow you may use ice or warm, moist heat. Do not apply ice or heat directly to the skin.  If you experience severe shortness of breath, go to the Emergency Room.  You may have numbness for several hours from the local anesthetic. Please use caution and common sense, especially with weight-bearing activities.  You may have an increase or change in the discomfort for 36-48 hours after your treatment. Apply ice and continue with any pain medicine you have been prescribed.  Do not do anything strenuous today. You may shower, but no tub baths or hot tubs today. You may resume your normal activities tomorrow, but do not “overdo it”. Resume normal activities slowly when you are feeling better.  If you experience redness, drainage or swelling at the injection site, or if you develop a fever above 100 degrees, please call [facility] at [phone] or go to the Emergency Room.  Continue to take all routine medicines prescribed by your primary care physician unless otherwise instructed by our staff. Most blood thinners should be started again according to your regularly scheduled dosing. If you have any questions, please give our office a call.    As no general anesthesia was used in today's procedure, you should not experience any side effects related to anesthesia.       If you have a problem specifically related to your procedure, please call our office at [phone].  Problems not related to your procedure should be directed to your primary care physician.

## 2025-06-06 ENCOUNTER — TELEPHONE (OUTPATIENT)
Age: 35
End: 2025-06-06

## 2025-06-06 NOTE — TELEPHONE ENCOUNTER
S/w pt. Pt is requesting a refill of her  methocarbamol 500 mg bid prn. Med last ordered 4/29 and pt has a f/u ov on 7/11. Per pt med provides relief and denies side effects except for feeling slightly sleepy when she takes it.

## 2025-06-06 NOTE — TELEPHONE ENCOUNTER
Pt contacted Call Center requested refill of their medication.        Doctor Name: Dr. Mcnamara      Medication Name: methocarbamol (ROBAXIN)       Dosage of Med: 500 mg      Frequency of Med:        roxann 1 tablet (500 mg total) by mouth 2 (two) times a day as needed for muscle spasms                 Remaining Medication: 1      Pharmacy and Location:     [id number] - [place], [place] - [address]  [address] [address]  Phone: [phone]         Pt. Preferred Callback Phone Number: [phone]      Thank you.

## 2025-06-08 DIAGNOSIS — M79.18 BILATERAL BUTTOCK PAIN: ICD-10-CM

## 2025-06-08 DIAGNOSIS — M54.12 CERVICAL RADICULOPATHY: ICD-10-CM

## 2025-06-08 DIAGNOSIS — M46.1 SACROILIITIS (HCC): ICD-10-CM

## 2025-06-08 DIAGNOSIS — M53.3 TRAUMATIC COCCYDYNIA: ICD-10-CM

## 2025-06-08 DIAGNOSIS — M54.2 NECK PAIN: ICD-10-CM

## 2025-06-08 RX ORDER — METHOCARBAMOL 500 MG/1
500 TABLET, FILM COATED ORAL 2 TIMES DAILY PRN
Qty: 60 TABLET | Refills: 1 | Status: SHIPPED | OUTPATIENT
Start: 2025-06-08

## 2025-06-10 ENCOUNTER — OFFICE VISIT (OUTPATIENT)
Dept: PAIN MEDICINE | Facility: CLINIC | Age: 35
End: 2025-06-10
Payer: COMMERCIAL

## 2025-06-10 VITALS
HEIGHT: 60 IN | DIASTOLIC BLOOD PRESSURE: 99 MMHG | HEART RATE: 82 BPM | WEIGHT: 133 LBS | SYSTOLIC BLOOD PRESSURE: 155 MMHG | BODY MASS INDEX: 26.11 KG/M2

## 2025-06-10 DIAGNOSIS — M62.838 MUSCLE SPASM: ICD-10-CM

## 2025-06-10 DIAGNOSIS — M79.18 MYOFASCIAL PAIN SYNDROME: Primary | ICD-10-CM

## 2025-06-10 PROCEDURE — 99214 OFFICE O/P EST MOD 30 MIN: CPT | Performed by: PHYSICAL MEDICINE & REHABILITATION

## 2025-06-10 NOTE — PROGRESS NOTES
Name: Tabatha Moe      : 1990      MRN: [id number]  Encounter Provider: Emeka Mcnamara DO  Encounter Date: 6/10/2025   Encounter department: [facility]  :  Assessment & Plan  Myofascial pain syndrome         Muscle spasm         Ms. Moe is a pleasant 34-year-old female significant past medical history of of coccydynia, sacroiliitis and neck pain presents to [facility] for follow-up and reevaluation as well as MRI discussion.  During today's evaluation she is demonstrating myofascial pain of the left cervical paraspinals.  MRI did not reveal any significant findings of disc herniations, stenosis centrally or foraminally.  Majority of her symptoms do appear to be soft tissue related and at this time we will plan for left-sided trigger point injections under ultrasound guidance.  Will hold off on medication management at this time.  All questions answered, patient is agreeable with plan.    Complete risks and benefits including bleeding, infection, tissue reaction, nerve injury and allergic reaction were discussed. The approach was demonstrated using models and literature was provided. Verbal and written consent was obtained.    My impressions and treatment recommendations were discussed in detail with the patient who verbalized understanding and had no further questions.  Discharge instructions were provided. I personally saw and examined the patient and I agree with the above discussed plan of care.    History of Present Illness     Tabatha Moe is a 34 y.o. female who presents for a follow up office visit in regards to Neck Pain (Neck pain -MRI cervical 2025). The patient’s current symptoms include neck pain with radiating symptoms into the left shoulder currently rated 6 out of 10 is described as a intermittent throbbing, shooting, aching sensation.  Presents today for follow-up, reevaluation and MRI discussion      Opioid contract  date    Last UDS Date: No results in last 5 years                    last taken on    Review of Systems   Respiratory:  Negative for shortness of breath.    Cardiovascular:  Negative for chest pain.   Gastrointestinal:  Negative for constipation, diarrhea, nausea and vomiting.   Musculoskeletal:  Positive for neck pain. Negative for arthralgias, gait problem, joint swelling and myalgias.   Skin:  Negative for rash.   Neurological:  Negative for dizziness, seizures and weakness.   All other systems reviewed and are negative.      Medical History Reviewed by provider this encounter:     .     Objective   /99 (BP Location: Right arm, Patient Position: Sitting, Cuff Size: Standard)   Pulse 82   Ht 5' (1.524 m)   Wt 60.3 kg (133 lb)   BMI 25.97 kg/m²      Pain Score:   8  Physical Exam  Constitutional: normal, well developed, well nourished, alert, in no distress and non-toxic and no overt pain behavior.  Eyes: anicteric  HEENT: grossly intact  Neck: supple, symmetric, trachea midline and no masses   Pulmonary: even and unlabored  Cardiovascular: No edema or pitting edema present  Skin: Normal without rashes or lesions and well hydrated  Psychiatric: Mood and affect appropriate  Neurologic: Cranial Nerves II-XII grossly intact  Musculoskeletal: normal

## 2025-06-18 ENCOUNTER — TELEPHONE (OUTPATIENT)
Dept: PAIN MEDICINE | Facility: MEDICAL CENTER | Age: 35
End: 2025-06-18

## 2025-06-19 ENCOUNTER — OFFICE VISIT (OUTPATIENT)
Age: 35
End: 2025-06-19

## 2025-06-19 VITALS — BODY MASS INDEX: 25.97 KG/M2 | WEIGHT: 133 LBS

## 2025-06-19 DIAGNOSIS — M54.59 MECHANICAL LOW BACK PAIN: ICD-10-CM

## 2025-06-19 DIAGNOSIS — M99.04 SEGMENTAL DYSFUNCTION OF SACRAL REGION: ICD-10-CM

## 2025-06-19 DIAGNOSIS — M99.03 SEGMENTAL DYSFUNCTION OF LUMBAR REGION: ICD-10-CM

## 2025-06-19 DIAGNOSIS — M99.02 SEGMENTAL DYSFUNCTION OF THORACIC REGION: ICD-10-CM

## 2025-06-19 DIAGNOSIS — M99.01 SEGMENTAL DYSFUNCTION OF CERVICAL REGION: ICD-10-CM

## 2025-06-19 DIAGNOSIS — M54.2 NECK PAIN: Primary | ICD-10-CM

## 2025-06-19 PROCEDURE — 98941 CHIROPRACT MANJ 3-4 REGIONS: CPT | Performed by: CHIROPRACTOR

## 2025-06-19 NOTE — PROGRESS NOTES
Diagnoses and all orders for this visit:    Neck pain    Segmental dysfunction of lumbar region    Segmental dysfunction of sacral region    Mechanical low back pain    Segmental dysfunction of cervical region    Segmental dysfunction of thoracic region      ASSESSMENT:  Pt's symptoms and exam findings consistent with mechanical neck/ubp secondary to repetitive st/sp injury, exacerbated by postural/ergonomic stressors. Pt responded well to stretches and manual mobilization of the affected spinal and myofascial tissues with increased ROM; trial of conservative tx recommended consisting of stretching, ther-ex, graded mobilization/manipulation of the affected spinal/myofascial tissues, postural/ergonomic education and take home stretches/exercises. If symptoms fail to improve with short trial of conservative care, appropriate imaging and referral will be coordinated.  -Pt has little change last visit, tolerated treatment well today with some decrease in pain and mm spasm.   - Neck pain is still improving only temporarily, at this time Cervical MRI will be ordered. Referral to [facility] for consult we well.   5/1/25- Flare up in L neck pain, tolerated treatment fairly well. MRI is coming up and recently had consult with Dr[facility]nam[facility].   5/29/25- The patient is feeling flare up with treatment and pain and mm spasm  6/19/25- The patient has flare up in neck pain but improvements in pain and mm spasm post-trx.    PROCEDURE CODES: 29088    TREATMENT:  Fear avoidance behavior discussion, encouraged and reassured pt that natural course of condition is to improve over time with adherence to tx plan and home care strategies. Home care recommendations: avoid bed rest, walk (but avoid trails and uneven surfaces), gradual return to activity to tolerance (avoid anything that peripheralizes symptoms), ice 20 min on/off, watch for ice burn, call if symptoms peripheralize, worsen, or neurologic deficit progresses.  Ther-ex: IASTM - discussed post procedure soreness and/or ecchymosis for up to 36 hrs, applied to affected mm hypertonicities; wall arely, axial retraction, upper trap stretch, lev scap stretch, SCM stretch, lat rows with t-band, lat pull down with t-band, abdominal bracing; greater than 15 min spent performing above mentioned ther-ex. Thoracic mobilization/manipulation: prone P-A mob; cervical mobilization/manipulation: diversified supine graded mobilization, cervical traction, prone C/T jct HVLA    HPI:  Tabatha Moe is a 34 y.o. female   Chief Complaint   Patient presents with   • Neck - Follow-up     Neck is very sore  Pain score 8   • Back Pain     Lower lumbar on left side is sore     Pain score 6         The patient presents to the office with L sided neck pain and lower back pain across the back. Both pain complaints are chronic in nature- neck pain ranges from 7-9/10 and lower back is 6-8/10. The patient has gone to Chiropractic since she was 11 but then mentions her chiropractor retired. Exacerbations-Looking down and working at [employer]. 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 The pt across the lower back, its pinching with some shooting. He has a 3 yr old and although is usually physical active at this time is not doing much exercise. Headaches- suboccipital, only frequently, No significant exercise routine.  1/28/25- The patient   2/4/25- 6-7 on the R and  and 7-8/10 in the lower back, no change in pain after her last visit.   2/11- The patient has increased pain after being sick with bronchitis,  L rib cage pain. 7/10 pain in the neck and back  2/18/25- The patient is feeling a little   2/27/25-- The patient is feeling a little better this week,  6/10 in the neck and the lower back  4/10 on L lower back  3/4/25- The patient is feeling is neck pain 6/10 more on the R and lower back pain 5/10 on the L  3/11/25- The patient is feeling 7/10 in the L neck and L side of  "the of the lower back    - The patient reports improvements for only a short time before pain is 7/10 in the neck and 8/10 in the lower back  4/1/25- Pt feels about the same today 7/10 pain in the neck and lower back. Temporary improvements after treatment.  4/15/25- The patient with increases neck pain 8/10 and the lower 4-5/10.  5/1/25- The patient reports she tweaked L neck so its up to a 7/10. She has MRI coming up and her consult with [facility] with Dr. Mcnamara went well. Since we are still waiting on Cervical MRI, Dr. Mcnamara will be addressing the patient's chronic coccyx pain first.   5/29/25- The pt was feeling overall \"ok\" but then she forgot her age and did flip and had increased pain  6/19/25- The patient reports her neck pain is more so after school ended and its more sedentary.  8/10 in the neck lower back 6/10    Back Pain  Associated symptoms include headaches.   Neck Pain   Associated symptoms include headaches.     Past Medical History:   Diagnosis Date   • Anemia    • Anxiety    • Deviated septum    • Frequent urination    • Gastritis    • Headache    • Hiatal hernia    • History of chickenpox    • Post partum depression 06/30/2021   • Reflux esophagitis    • Sinusitis    • Wears contact lenses       The following portions of the patient's history were reviewed and updated as appropriate: allergies, past family history, past medical history, past social history, past surgical history, and problem list.  Review of Systems   Musculoskeletal:  Positive for back pain, myalgias, neck pain and neck stiffness.   Neurological:  Positive for headaches.     Physical Exam  Constitutional:       Appearance: Normal appearance.   HENT:      Head: Normocephalic.   Abdominal:      General: Abdomen is flat.      Palpations: Abdomen is soft.     Musculoskeletal:         General: Tenderness present. Normal range of motion.        Arms:       Cervical back: Normal range of motion. Rigidity and tenderness present. "     Neurological:      Mental Status: She is alert and oriented to person, place, and time.      Gait: Gait is intact.      Deep Tendon Reflexes: Reflexes are normal and symmetric.     Psychiatric:         Attention and Perception: Attention normal.         Mood and Affect: Mood and affect normal.         Speech: Speech normal.         Behavior: Behavior normal. Behavior is cooperative.         Thought Content: Thought content normal.         Cognition and Memory: Cognition normal.         Judgment: Judgment normal.       SOFT TISSUE ASSESSMENT: Hypertonicity and tenderness palpated C5-T6 erector spinae, upper traps, lev scap, SCM, scalene, rhomboid, suboccipitals, L3-S1 QL, parapsinals, psoas, hip flexor JOINT RECTRICTIONS: C5-T6, L3-S1 ORTHO: Latia unremarkable for centralization/peripheralization; max foraminal comp elicits local np R/L; shoulder depression elicits stiffness in L upper trap; brachial plexus tension test elicits no neural tension in R/L UE; cervical distraction elicits relieves CC, SLUMP- neg, Sitting ROOT- Neg, SLR- neg    Return in about 1 week (around 6/26/2025) for Recheck.

## 2025-07-03 ENCOUNTER — TELEPHONE (OUTPATIENT)
Age: 35
End: 2025-07-03

## 2025-07-10 ENCOUNTER — PROCEDURE VISIT (OUTPATIENT)
Dept: PAIN MEDICINE | Facility: CLINIC | Age: 35
End: 2025-07-10
Payer: COMMERCIAL

## 2025-07-10 DIAGNOSIS — M79.18 MYOFASCIAL PAIN SYNDROME: Primary | ICD-10-CM

## 2025-07-10 PROCEDURE — 20552 NJX 1/MLT TRIGGER POINT 1/2: CPT | Performed by: PHYSICAL MEDICINE & REHABILITATION

## 2025-07-10 PROCEDURE — 76942 ECHO GUIDE FOR BIOPSY: CPT | Performed by: PHYSICAL MEDICINE & REHABILITATION

## 2025-07-10 RX ORDER — LIDOCAINE HYDROCHLORIDE 10 MG/ML
2 INJECTION, SOLUTION INFILTRATION; PERINEURAL ONCE
Status: COMPLETED | OUTPATIENT
Start: 2025-07-10 | End: 2025-07-10

## 2025-07-10 RX ORDER — LORAZEPAM 0.5 MG/1
0.5 TABLET ORAL DAILY PRN
COMMUNITY
Start: 2025-06-17

## 2025-07-10 RX ORDER — METHYLPREDNISOLONE ACETATE 40 MG/ML
40 INJECTION, SUSPENSION INTRA-ARTICULAR; INTRALESIONAL; INTRAMUSCULAR; SOFT TISSUE ONCE
Status: COMPLETED | OUTPATIENT
Start: 2025-07-10 | End: 2025-07-10

## 2025-07-10 RX ADMIN — LIDOCAINE HYDROCHLORIDE 2 ML: 10 INJECTION, SOLUTION INFILTRATION; PERINEURAL at 16:30

## 2025-07-10 RX ADMIN — METHYLPREDNISOLONE ACETATE 40 MG: 40 INJECTION, SUSPENSION INTRA-ARTICULAR; INTRALESIONAL; INTRAMUSCULAR; SOFT TISSUE at 16:30

## 2025-07-10 NOTE — PROGRESS NOTES
Indication:  Muscular pain  Preprocedure diagnosis:  1.  Myofascial pain syndrome  Postprocedure diagnosis:  1.  Myofascial pain syndrome     Procedure:  Ultrasound-guided left cervical thoracic paraspinal muscle trigger point injection(s).     After discussing the risks, benefits, and alternatives to the procedure, the patient expressed understanding and wished to proceed.  The patient was brought to the procedure suite and placed in the seated position.  A procedural pause was conducted to verify:  correct patient identity, procedure to be performed and as applicable, correct side and site, correct patient position, and availability of implants, special equipment or special requirements.  A simple surgical tray was used.  Prior to the procedure, the left-sided cervical thoracic paraspinal musculature was examined with a 12 MHz linear transducer to visualize the target trigger points and determine the optimal needle path. Following this, the area was prepared with a ChloraPrep scrub, then re-examined using the same transducer, a sterile ultrasound transducer cover, and sterile ultrasound transducer gel.  Thereafter, using ultrasound guidance, a 2.5-inch 25-gauge needle was advanced into the target trigger points. After visualization of the tip and negative aspiration for blood, a mixture of 1% lidocaine with 40 mg/mL Depo-Medrol was injected into the target trigger points. Following the injection, the needle was withdrawn.  The patient tolerated the procedure well and there were no apparent complications.  After an appropriate amount of observation, the patient was dismissed from the clinic in good condition under their own power.

## 2025-07-24 ENCOUNTER — TELEPHONE (OUTPATIENT)
Dept: PAIN MEDICINE | Facility: CLINIC | Age: 35
End: 2025-07-24

## 2025-08-07 ENCOUNTER — OFFICE VISIT (OUTPATIENT)
Age: 35
End: 2025-08-07

## 2025-08-12 ENCOUNTER — OFFICE VISIT (OUTPATIENT)
Age: 35
End: 2025-08-12

## 2025-08-21 ENCOUNTER — OFFICE VISIT (OUTPATIENT)
Age: 35
End: 2025-08-21

## 2025-08-21 VITALS
HEART RATE: 90 BPM | HEIGHT: 60 IN | WEIGHT: 133 LBS | DIASTOLIC BLOOD PRESSURE: 90 MMHG | BODY MASS INDEX: 26.11 KG/M2 | SYSTOLIC BLOOD PRESSURE: 132 MMHG

## 2025-08-21 DIAGNOSIS — M99.01 SEGMENTAL DYSFUNCTION OF CERVICAL REGION: ICD-10-CM

## 2025-08-21 DIAGNOSIS — M99.03 SEGMENTAL DYSFUNCTION OF LUMBAR REGION: ICD-10-CM

## 2025-08-21 DIAGNOSIS — M99.04 SEGMENTAL DYSFUNCTION OF SACRAL REGION: ICD-10-CM

## 2025-08-21 DIAGNOSIS — M54.59 MECHANICAL LOW BACK PAIN: ICD-10-CM

## 2025-08-21 DIAGNOSIS — M54.2 NECK PAIN: Primary | ICD-10-CM

## 2025-08-21 DIAGNOSIS — M99.02 SEGMENTAL DYSFUNCTION OF THORACIC REGION: ICD-10-CM

## 2025-08-21 DIAGNOSIS — R10.2 PELVIC PAIN IN FEMALE: ICD-10-CM

## 2025-08-21 PROCEDURE — 98941 CHIROPRACT MANJ 3-4 REGIONS: CPT | Performed by: CHIROPRACTOR
